# Patient Record
Sex: MALE | Race: WHITE | NOT HISPANIC OR LATINO | Employment: FULL TIME | URBAN - METROPOLITAN AREA
[De-identification: names, ages, dates, MRNs, and addresses within clinical notes are randomized per-mention and may not be internally consistent; named-entity substitution may affect disease eponyms.]

---

## 2024-10-22 ENCOUNTER — HOSPITAL ENCOUNTER (OUTPATIENT)
Dept: OTHER | Facility: HOSPITAL | Age: 59
Discharge: HOME | End: 2024-10-22

## 2024-10-24 ENCOUNTER — OFFICE VISIT (OUTPATIENT)
Dept: FAMILY MEDICINE CLINIC | Facility: CLINIC | Age: 59
End: 2024-10-24
Payer: COMMERCIAL

## 2024-10-24 VITALS
HEIGHT: 72 IN | DIASTOLIC BLOOD PRESSURE: 84 MMHG | TEMPERATURE: 98.3 F | HEART RATE: 89 BPM | SYSTOLIC BLOOD PRESSURE: 126 MMHG | BODY MASS INDEX: 24.92 KG/M2 | OXYGEN SATURATION: 97 % | WEIGHT: 184 LBS

## 2024-10-24 DIAGNOSIS — Z11.59 NEED FOR HEPATITIS C SCREENING TEST: ICD-10-CM

## 2024-10-24 DIAGNOSIS — M51.362 DEGENERATION OF INTERVERTEBRAL DISC OF LUMBAR REGION WITH DISCOGENIC BACK PAIN AND LOWER EXTREMITY PAIN: ICD-10-CM

## 2024-10-24 DIAGNOSIS — M54.16 CHRONIC RADICULAR LUMBAR PAIN: Primary | ICD-10-CM

## 2024-10-24 DIAGNOSIS — Z12.11 SCREEN FOR COLON CANCER: ICD-10-CM

## 2024-10-24 DIAGNOSIS — K57.90 DIVERTICULOSIS: ICD-10-CM

## 2024-10-24 DIAGNOSIS — G89.29 CHRONIC RADICULAR LUMBAR PAIN: Primary | ICD-10-CM

## 2024-10-24 DIAGNOSIS — Z13.220 SCREENING FOR HYPERLIPIDEMIA: ICD-10-CM

## 2024-10-24 DIAGNOSIS — I45.10 RIGHT BUNDLE BRANCH BLOCK: ICD-10-CM

## 2024-10-24 DIAGNOSIS — R94.31 RIGHT AXIS DEVIATION: ICD-10-CM

## 2024-10-24 DIAGNOSIS — Z13.1 SCREENING FOR DIABETES MELLITUS: ICD-10-CM

## 2024-10-24 DIAGNOSIS — Z98.1 S/P ANKLE FUSION: ICD-10-CM

## 2024-10-24 DIAGNOSIS — N40.0 BENIGN PROSTATIC HYPERPLASIA WITHOUT LOWER URINARY TRACT SYMPTOMS: ICD-10-CM

## 2024-10-24 DIAGNOSIS — Z12.5 SCREENING FOR PROSTATE CANCER: ICD-10-CM

## 2024-10-24 PROBLEM — M54.42 CHRONIC LEFT-SIDED LOW BACK PAIN WITH LEFT-SIDED SCIATICA: Status: ACTIVE | Noted: 2024-10-24

## 2024-10-24 LAB
ALBUMIN SERPL-MCNC: 4.5 G/DL (ref 3.5–5.2)
ALBUMIN/GLOB SERPL: 2.1 G/DL
ALP SERPL-CCNC: 54 U/L (ref 39–117)
ALT SERPL W P-5'-P-CCNC: 28 U/L (ref 1–41)
ANION GAP SERPL CALCULATED.3IONS-SCNC: 16 MMOL/L (ref 5–15)
AST SERPL-CCNC: 22 U/L (ref 1–40)
BILIRUB SERPL-MCNC: 0.4 MG/DL (ref 0–1.2)
BUN SERPL-MCNC: 22 MG/DL (ref 8–23)
BUN/CREAT SERPL: 19.8 (ref 7–25)
CALCIUM SPEC-SCNC: 10.1 MG/DL (ref 8.6–10.5)
CHLORIDE SERPL-SCNC: 103 MMOL/L (ref 98–107)
CHOLEST SERPL-MCNC: 230 MG/DL (ref 0–200)
CO2 SERPL-SCNC: 21 MMOL/L (ref 22–29)
CREAT SERPL-MCNC: 1.11 MG/DL (ref 0.76–1.27)
EGFRCR SERPLBLD CKD-EPI 2021: 75.5 ML/MIN/1.73
GLOBULIN UR ELPH-MCNC: 2.1 GM/DL
GLUCOSE SERPL-MCNC: 99 MG/DL (ref 65–99)
HBA1C MFR BLD: 5.6 % (ref 4.8–5.6)
HCV AB SER QL: NORMAL
HDLC SERPL-MCNC: 35 MG/DL (ref 40–60)
LDLC SERPL CALC-MCNC: 165 MG/DL (ref 0–100)
LDLC/HDLC SERPL: 4.65 {RATIO}
POTASSIUM SERPL-SCNC: 4.3 MMOL/L (ref 3.5–5.2)
PROT SERPL-MCNC: 6.6 G/DL (ref 6–8.5)
PSA SERPL-MCNC: 0.72 NG/ML (ref 0–4)
SODIUM SERPL-SCNC: 140 MMOL/L (ref 136–145)
TRIGL SERPL-MCNC: 161 MG/DL (ref 0–150)
VLDLC SERPL-MCNC: 30 MG/DL (ref 5–40)

## 2024-10-24 PROCEDURE — 83036 HEMOGLOBIN GLYCOSYLATED A1C: CPT | Performed by: STUDENT IN AN ORGANIZED HEALTH CARE EDUCATION/TRAINING PROGRAM

## 2024-10-24 PROCEDURE — 86803 HEPATITIS C AB TEST: CPT | Performed by: STUDENT IN AN ORGANIZED HEALTH CARE EDUCATION/TRAINING PROGRAM

## 2024-10-24 PROCEDURE — G0103 PSA SCREENING: HCPCS | Performed by: STUDENT IN AN ORGANIZED HEALTH CARE EDUCATION/TRAINING PROGRAM

## 2024-10-24 PROCEDURE — 80053 COMPREHEN METABOLIC PANEL: CPT | Performed by: STUDENT IN AN ORGANIZED HEALTH CARE EDUCATION/TRAINING PROGRAM

## 2024-10-24 PROCEDURE — 80061 LIPID PANEL: CPT | Performed by: STUDENT IN AN ORGANIZED HEALTH CARE EDUCATION/TRAINING PROGRAM

## 2024-10-24 PROCEDURE — 99204 OFFICE O/P NEW MOD 45 MIN: CPT | Performed by: STUDENT IN AN ORGANIZED HEALTH CARE EDUCATION/TRAINING PROGRAM

## 2024-10-24 RX ORDER — NAPROXEN 500 MG/1
500 TABLET ORAL 2 TIMES DAILY WITH MEALS
Qty: 14 TABLET | Refills: 0 | Status: SHIPPED | OUTPATIENT
Start: 2024-10-24

## 2024-10-24 RX ORDER — CYCLOBENZAPRINE HCL 5 MG
5 TABLET ORAL 3 TIMES DAILY PRN
Qty: 30 TABLET | Refills: 0 | Status: SHIPPED | OUTPATIENT
Start: 2024-10-24

## 2024-10-24 NOTE — PROGRESS NOTES
Chief Complaint  Chief Complaint   Patient presents with    Annual Exam        Subjective       Juvenal Issa presents to [facility]    History of Present Illness  The patient is a 59-year-old male establishing as a new patient.    He has a history of a right ankle fracture, which was surgically repaired. He reports that his foot is off-center and his toes are turning inward. He has been doing his own physical therapy and has regained full range of motion in his ankle, although it remains stiff. He reports no pain in the right ankle but mentions that his toes cramp up if he does not wear shoes.    He reports a discrepancy in his hip alignment, with a difference of 19 mm, and issues with his spine. His chiropractor has recommended an MRI due to fusion of the tailbone and another bone. He experiences pain in his lower back, which he attributes to an incident in [place] where he lifted a suitcase. He also reports sciatic pain, which radiates to his legs and causes twitching. This pain disrupts his sleep and has led to the development of dark circles under his eyes. He has experienced periods of severe pain, particularly over the past weekend, but reports feeling better today. He mentions that his back pain began in 2018 after moving to [place] [employer] and lifting some items. He has been taking Advil for pain relief, sometimes up to five tablets at a time. He reports that his pain level can reach up to 10 on a scale of 1 to 10. He has been able to walk up to two miles after warming up, but reports that his muscles become tight and painful afterward. He reports no incontinence or loss of control of urine or bowel. He also reports no numbness or tingling in the saddle area or groin region.    He reports that his urinary stream has slowed down over the years, but he is able to sleep through the night without needing to urinate. He has regular bowel movements. He has not had blood work done in a  "while and is unsure if it was done at [facility]. He has had a prostate exam in the past. 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 He reports a decrease in  strength. He has not received the COVID-19 vaccine and declines the influenza vaccine. He has not had a colonoscopy or colon cancer screening before.    He had a CT scan of the abdomen when he had a kidney stone. He went to the ER and was given morphine. He took one Percocet at home.    SOCIAL HISTORY  He was in the .    FAMILY HISTORY  He denies any family history of diabetes. His mother is 90 years old and has prediabetes. His father had heart issues.    Past Medical History:    Ankle fracture, right         No Known Allergies       Past Surgical History:   Procedure Laterality Date    ANKLE FUSION Right 2000          Social History     Tobacco Use    Smoking status: Never    Smokeless tobacco: Never   Vaping Use    Vaping status: Never Used   Substance Use Topics    Alcohol use: Not Currently    Drug use: Never         No family history on file.       No current outpatient medications on file prior to visit.     No current facility-administered medications on file prior to visit.           There is no immunization history on file for this patient.          Objective         /84 (BP Location: Left arm, Patient Position: Sitting)   Pulse 89   Temp 98.3 °F (36.8 °C) (Oral)   Ht 182.9 cm (72\")   Wt 83.5 kg (184 lb)   SpO2 97%   BMI 24.95 kg/m²           Physical Exam  Physical Exam  HENT:      Head: Normocephalic and atraumatic.      Nose: Nose normal.      Mouth/Throat:      Mouth: Mucous membranes are moist.   Eyes:      Extraocular Movements: Extraocular movements intact.      Conjunctiva/sclera: Conjunctivae normal.   Cardiovascular:      Rate and Rhythm: Normal rate and regular rhythm.      Heart sounds: No murmur heard.     No friction rub. No gallop. "   Pulmonary:      Effort: No respiratory distress.      Breath sounds: No wheezing, rhonchi or rales.   Abdominal:      General: Abdomen is flat. There is no distension.   Musculoskeletal:         General: No swelling.      Cervical back: Neck supple.      Comments: Right anterior innominate rotation.  Right leg is shorter than left leg on exam.  Tenderness to palpation of the left L5 paravertebral musculature.  Hyper facilitation of the thoracic paravertebral musculature about T10-T7 area.   Skin:     General: Skin is warm and dry.   Neurological:      General: No focal deficit present.      Mental Status: He is alert and oriented to person, place, and time.   Psychiatric:         Mood and Affect: Mood normal.         Behavior: Behavior normal.         Thought Content: Thought content normal.         Judgment: Judgment normal.         Physical Exam        Result Review :    Results  Imaging  Lumbar spine x-ray shows loss of disc height, moderate in intensity.  CT scan from 2017 shows shows disc degeneration at L5-S1 area.    Testing  EKG from 1992 shows a right bundle branch block.  Right axis deviation.             Assessment and Plan     Diagnoses and all orders for this visit:    1. S/P ankle fusion (Primary)    2. Chronic radicular lumbar pain  -     naproxen (Naprosyn) 500 MG tablet; Take 1 tablet by mouth 2 (Two) Times a Day With Meals.  Dispense: 14 tablet; Refill: 0  -     cyclobenzaprine (FLEXERIL) 5 MG tablet; Take 1 tablet by mouth 3 (Three) Times a Day As Needed for Muscle Spasms.  Dispense: 30 tablet; Refill: 0  -     MRI Lumbar Spine Without Contrast; Future    3. Diverticulosis    4. Degeneration of intervertebral disc of lumbar region with discogenic back pain and lower extremity pain    5. Benign prostatic hyperplasia without lower urinary tract symptoms  -     PSA SCREENING    6. Right bundle branch block    7. Right axis deviation    8. Screening for hyperlipidemia  -     Lipid Panel    9.  Screening for diabetes mellitus  -     Hemoglobin A1c  -     Comprehensive Metabolic Panel    10. Screening for prostate cancer  -     PSA SCREENING        Assessment & Plan  1. Radiculopathy.  Symptoms align with radiculopathy, with pain likely originating from the L1, L2, L4, or L5 nerves. The right hip appears to be compensating inadequately, leading to compensatory actions by the spine. A heel lift of approximately 1/4 inch was recommended, with the option to adjust the height based on symptom improvement or worsening. An MRI was ordered to further investigate the issue. Prescriptions for naproxen (twice daily as needed) and a muscle relaxer (up to three times daily as needed) were provided. He was advised to use these medications judiciously and to exercise caution while driving. Engaging in brisk walking, either outdoors or on a treadmill, was encouraged to assess its impact on symptoms. Should symptoms worsen to the point of affecting mobility or sleep, he is to inform the clinic immediately for a re-evaluation. In case of sudden incontinence or new numbness in the groin area, he is to contact the clinic promptly.    2. Diverticulosis.  Diverticulosis is not currently causing any issues. No specific treatment is required at this time.  Seen on CT scan from 2017    3. Slight Prostate Enlargement.  A slight enlargement of the prostate was noted on a previous CT scan, which can be normal. A prostate blood test was ordered to further evaluate the condition.    4. Right Bundle Branch Block.  An EKG showed a right bundle branch block. This will be monitored, and no immediate treatment is required as he is asymptomatic.    6. Health Maintenance.  Baseline blood work was ordered to assess cholesterol, kidney function, liver function, and to screen for diabetes. He declined the COVID-19 booster and influenza vaccine. A tetanus vaccine was offered but declined. Colon cancer screening was discussed but deferred for  now.    Follow-up  Return in about 6 weeks for follow-up.          BMI is within normal parameters. No other follow-up for BMI required.       Follow Up   Return in about 6 weeks (around 12/5/2024) for back pain.    Patient was given instructions and counseling regarding his condition or for health maintenance advice. Please see specific information pulled into the AVS if appropriate.     Juvenal Issa  reports that he has never smoked. He has never used smokeless tobacco.            Patient or patient representative verbalized consent for the use of Ambient Listening during the visit with  Ronal Stinson DO for chart documentation. 10/24/2024  11:22 EDT

## 2024-10-25 DIAGNOSIS — E78.2 MIXED HYPERLIPIDEMIA: Primary | ICD-10-CM

## 2024-10-25 RX ORDER — ROSUVASTATIN CALCIUM 10 MG/1
10 TABLET, COATED ORAL DAILY
Qty: 90 TABLET | Refills: 1 | Status: SHIPPED | OUTPATIENT
Start: 2024-10-25

## 2024-11-14 ENCOUNTER — OFFICE VISIT (OUTPATIENT)
Dept: FAMILY MEDICINE CLINIC | Facility: CLINIC | Age: 59
End: 2024-11-14
Payer: COMMERCIAL

## 2024-11-14 VITALS
HEART RATE: 115 BPM | TEMPERATURE: 98.7 F | BODY MASS INDEX: 24.61 KG/M2 | OXYGEN SATURATION: 96 % | WEIGHT: 181.7 LBS | DIASTOLIC BLOOD PRESSURE: 86 MMHG | HEIGHT: 72 IN | SYSTOLIC BLOOD PRESSURE: 130 MMHG

## 2024-11-14 DIAGNOSIS — M70.62 TROCHANTERIC BURSITIS OF LEFT HIP: ICD-10-CM

## 2024-11-14 DIAGNOSIS — M51.362 DEGENERATION OF INTERVERTEBRAL DISC OF LUMBAR REGION WITH DISCOGENIC BACK PAIN AND LOWER EXTREMITY PAIN: ICD-10-CM

## 2024-11-14 DIAGNOSIS — M50.30 DEGENERATIVE DISC DISEASE, CERVICAL: ICD-10-CM

## 2024-11-14 DIAGNOSIS — G89.29 CHRONIC RADICULAR LUMBAR PAIN: Primary | ICD-10-CM

## 2024-11-14 DIAGNOSIS — M54.16 CHRONIC RADICULAR LUMBAR PAIN: Primary | ICD-10-CM

## 2024-11-14 PROCEDURE — 99214 OFFICE O/P EST MOD 30 MIN: CPT | Performed by: STUDENT IN AN ORGANIZED HEALTH CARE EDUCATION/TRAINING PROGRAM

## 2024-11-14 RX ORDER — DICLOFENAC SODIUM 75 MG/1
75 TABLET, DELAYED RELEASE ORAL 2 TIMES DAILY
Qty: 60 TABLET | Refills: 0 | Status: SHIPPED | OUTPATIENT
Start: 2024-11-14

## 2024-11-14 RX ORDER — PSEUDOEPHED/ACETAMINOPH/DIPHEN 30MG-500MG
TABLET ORAL
COMMUNITY
Start: 2024-10-23

## 2024-11-14 NOTE — PROGRESS NOTES
"Chief Complaint  Follow-up (Medication does not help)    Subjective      Juvenal Issa is a 59 y.o. male who presents to [facility]     History of Present Illness  The patient is a 59-year-old male coming in for a follow-up for left hip pain.    He reports persistent pain in his left hip, as well as left lower leg.  The leg pain is described as a sensation of someone drilling a screw into his leg.  The left hip pain is particularly noticeable when sitting or standing and is located just below the bony part of the hip. He also experiences intermittent pain in the front of his groin. The pain in his hip is more severe than in his knee and is present when sitting, standing, and walking. He rates his current pain level as 7 out of 10.    His leg strength has improved, but he now experiences pain in his left knee, which tends to buckle. He has been taking Aleve, naproxen twice a day, Tylenol Extra Strength, and ibuprofen 500 mg for pain relief. He has been doing stretches, which he believes have helped with his sciatica, but he is concerned about potential damage from the stretches and is considering physical therapy.    He has noticed a disc in his neck that appears to be slipping out and is seeking x-rays from his chiropractor. He has been experiencing low back pain for several years, which he describes as a snapping sensation. He has a leg length discrepancy and has been using a heel lift, but not consistently due to pain. He has been using heat, massage, and ice for his back pain. He has been wearing tennis shoes with an insert for support and using a heating pad at night for relief.       Objective   Vital Signs:   Vitals:    11/14/24 1036   BP: 130/86   BP Location: Left arm   Patient Position: Sitting   Pulse: 115   Temp: 98.7 °F (37.1 °C)   TempSrc: Oral   SpO2: 96%   Weight: 82.4 kg (181 lb 11.2 oz)   Height: 182.9 cm (72\")     Body mass index is 24.64 kg/m².    Wt Readings from " Last 3 Encounters:   11/14/24 82.4 kg (181 lb 11.2 oz)   10/24/24 83.5 kg (184 lb)     BP Readings from Last 3 Encounters:   11/14/24 130/86   10/24/24 126/84       Health Maintenance   Topic Date Due    TDAP/TD VACCINES (1 - Tdap) Never done    ZOSTER VACCINE (1 of 2) Never done    COVID-19 Vaccine (1 - 2024-25 season) Never done    ANNUAL PHYSICAL  Never done    COLORECTAL CANCER SCREENING  11/14/2024 (Originally 1965)    INFLUENZA VACCINE  03/31/2025 (Originally 8/1/2024)    LIPID PANEL  10/24/2025    HEPATITIS C SCREENING  Completed    Pneumococcal Vaccine 0-64  Aged Out       Physical Exam  HENT:      Head: Normocephalic and atraumatic.      Nose: Nose normal.      Mouth/Throat:      Mouth: Mucous membranes are moist.   Eyes:      Extraocular Movements: Extraocular movements intact.      Conjunctiva/sclera: Conjunctivae normal.   Cardiovascular:      Rate and Rhythm: Normal rate and regular rhythm.      Heart sounds: No murmur heard.     No friction rub. No gallop.   Pulmonary:      Effort: No respiratory distress.      Breath sounds: No wheezing, rhonchi or rales.   Abdominal:      General: Abdomen is flat. There is no distension.   Musculoskeletal:         General: No swelling.      Cervical back: Neck supple.      Comments: Pain to palpation of the left trochanteric area   Skin:     General: Skin is warm and dry.   Neurological:      General: No focal deficit present.      Mental Status: He is alert and oriented to person, place, and time.   Psychiatric:         Mood and Affect: Mood normal.         Behavior: Behavior normal.         Thought Content: Thought content normal.         Judgment: Judgment normal.          Physical Exam      Result Review :  The following data was reviewed by: Ronal Stinson DO on 11/14/2024:    No Images in the past 120 days found..     Results  Imaging  Right hip joint space is well preserved with no signs of arthritis. Left hip joint space is also well preserved, but it is  difficult to determine if there is any arthritic disease present. Back x-ray shows a little scoliosis and degenerative disc disease at the lumbosacral joint. Neck x-ray shows some degenerative disc disease at the C6, C7 area.       Procedures          Diagnoses and all orders for this visit:    1. Chronic radicular lumbar pain (Primary)  -     Ambulatory Referral to Physical Therapy for Evaluation & Treatment    2. Degeneration of intervertebral disc of lumbar region with discogenic back pain and lower extremity pain  -     Ambulatory Referral to Physical Therapy for Evaluation & Treatment  -     diclofenac (VOLTAREN) 75 MG EC tablet; Take 1 tablet by mouth 2 (Two) Times a Day.  Dispense: 60 tablet; Refill: 0    3. Trochanteric bursitis of left hip  -     Ambulatory Referral to Physical Therapy for Evaluation & Treatment  -     diclofenac (VOLTAREN) 75 MG EC tablet; Take 1 tablet by mouth 2 (Two) Times a Day.  Dispense: 60 tablet; Refill: 0    4. Degenerative disc disease, cervical  -     Ambulatory Referral to Physical Therapy for Evaluation & Treatment         Assessment & Plan  1. Left hip pain.  The left hip pain is likely due to trochanteric bursitis, exacerbated by compensatory movements for his back and leg length discrepancies. The pain is expected to subside within 4 to 6 weeks with appropriate treatment. A referral for physical therapy has been made to address his back and left hip pain. He has been advised to wear a lift in his shoes to correct the leg length discrepancy. A prescription for diclofenac, to be taken twice daily, has been provided. He has also been advised to continue taking Tylenol Extra Strength 1000 mg three times a day. A Toradol injection was offered for immediate pain relief, but he declined. If there is no significant improvement by December 2024, a hip injection will be considered.    2. Degenerative disc disease.  The patient has degenerative disc disease at the lumbosacral joint  (L5-S1), which is contributing to his lower back pain. He has been advised to start with physical therapy, stretches, ice, and heat treatments. The use of a heel lift in his shoes is recommended to help with leg length discrepancy and alleviate some of the back pain. If there is no significant improvement, further interventions such as injections may be considered.    3. Sciatica.  The patient reports improvement in his sciatica symptoms with stretching exercises. He is advised to continue these exercises and physical therapy to maintain relief. If symptoms worsen, further evaluation and treatment will be considered.    4. Neck pain.  The patient has noted concerns about potential neck issues, x-ray showing potential degenerative disc disease. Although he is not currently experiencing pain, he is advised to strengthen his neck through physical therapy to prevent future issues.    5. Medication management.  He has been advised to switch his pharmacy to [facility] due to issues with [facility]. He has not started his cholesterol medication due to concerns about side effects. He is advised to communicate any pain management needs through the [facility] julio.    Follow-up  Patient is scheduled for a follow-up visit on December 5, 2024.    BMI is within normal parameters. No other follow-up for BMI required.         FOLLOW UP  Return in about 3 months (around 2/14/2025).  Patient was given instructions and counseling regarding his condition or for health maintenance advice. Please see specific information pulled into the AVS if appropriate.     Patient or patient representative verbalized consent for the use of Ambient Listening during the visit with  Ronal Stinson DO for chart documentation. 11/14/2024  12:27 EST    Ronal Stinson DO  11/14/24  12:26 EST    CURRENT & DISCONTINUED MEDICATIONS  Current Outpatient Medications   Medication Instructions    Acetaminophen Extra Strength 500 MG tablet Take 1 tablet by mouth every 4 to 6  hours as needed for pain or fever. Do not exceed 8 tablets in 24 hours unless directed by your doctor.    cyclobenzaprine (FLEXERIL) 5 mg, Oral, 3 Times Daily PRN    diclofenac (VOLTAREN) 75 mg, Oral, 2 Times Daily    rosuvastatin (CRESTOR) 10 mg, Oral, Daily       Medications Discontinued During This Encounter   Medication Reason    naproxen (Naprosyn) 500 MG tablet

## 2024-11-19 ENCOUNTER — HOSPITAL ENCOUNTER (OUTPATIENT)
Dept: MRI IMAGING | Facility: HOSPITAL | Age: 59
Discharge: HOME | End: 2024-11-19
Admitting: STUDENT IN AN ORGANIZED HEALTH CARE EDUCATION/TRAINING PROGRAM
Payer: COMMERCIAL

## 2024-11-19 DIAGNOSIS — G89.29 CHRONIC RADICULAR LUMBAR PAIN: ICD-10-CM

## 2024-11-19 DIAGNOSIS — M54.16 CHRONIC RADICULAR LUMBAR PAIN: ICD-10-CM

## 2024-11-19 PROCEDURE — 72148 MRI LUMBAR SPINE W/O DYE: CPT

## 2024-11-25 ENCOUNTER — OFFICE VISIT (OUTPATIENT)
Dept: FAMILY MEDICINE CLINIC | Facility: CLINIC | Age: 59
End: 2024-11-25
Payer: COMMERCIAL

## 2024-11-25 VITALS
WEIGHT: 181.3 LBS | HEIGHT: 72 IN | SYSTOLIC BLOOD PRESSURE: 110 MMHG | BODY MASS INDEX: 24.56 KG/M2 | HEART RATE: 121 BPM | OXYGEN SATURATION: 97 % | DIASTOLIC BLOOD PRESSURE: 80 MMHG | TEMPERATURE: 97.9 F

## 2024-11-25 DIAGNOSIS — M51.362 DEGENERATION OF INTERVERTEBRAL DISC OF LUMBAR REGION WITH DISCOGENIC BACK PAIN AND LOWER EXTREMITY PAIN: ICD-10-CM

## 2024-11-25 DIAGNOSIS — G89.29 CHRONIC RADICULAR LUMBAR PAIN: Primary | ICD-10-CM

## 2024-11-25 DIAGNOSIS — M50.30 DEGENERATIVE DISC DISEASE, CERVICAL: ICD-10-CM

## 2024-11-25 DIAGNOSIS — M54.16 CHRONIC RADICULAR LUMBAR PAIN: Primary | ICD-10-CM

## 2024-11-25 PROCEDURE — 99214 OFFICE O/P EST MOD 30 MIN: CPT | Performed by: STUDENT IN AN ORGANIZED HEALTH CARE EDUCATION/TRAINING PROGRAM

## 2024-11-25 RX ORDER — TRAMADOL HYDROCHLORIDE 50 MG/1
50 TABLET ORAL EVERY 8 HOURS PRN
Qty: 30 TABLET | Refills: 0 | Status: SHIPPED | OUTPATIENT
Start: 2024-11-25

## 2024-11-25 NOTE — PROGRESS NOTES
"Chief Complaint  Follow-up (MRI results)    Subjective      Juvenal Issa is a 59 y.o. male who presents to [facility]     History of Present Illness  The patient is a 59-year-old male who presents for evaluation of back pain and MRI results.    He reports experiencing various types of pain, including neck stiffness that previously limited his mobility. He does not have any current neck pain or radiating pain down his arms. He has been diagnosed with degenerative disc disease. His pain is managed with Aleve and ibuprofen, but these do not completely alleviate his discomfort. Diclofenac has been effective in reducing his pain although he still feels severe pain and is requesting something stronger. He has used Flexeril on a few occasions when his pain was severe, but it left him feeling groggy. He is seeking stronger pain relief as his current condition prevents him from working all day.    He uses crutches occasionally and has ordered a cane for additional support. He experiences constant numbness in his left leg and has had episodes of knee buckling and weakness in his left leg. He does not have any bowel or bladder incontinence.  The weakness in his left leg is transient and is not consistent.  He denies any red flag symptoms such as urinary bladder or bowel incontinence or saddle anesthesia.    His appetite has decreased, leading to weight loss. He expresses a desire to walk but fears the pain it may cause.       Objective   Vital Signs:   Vitals:    11/25/24 0725   BP: 110/80   BP Location: Left arm   Patient Position: Sitting   Pulse: (!) 121   Temp: 97.9 °F (36.6 °C)   TempSrc: Oral   SpO2: 97%   Weight: 82.2 kg (181 lb 4.8 oz)   Height: 182.9 cm (72\")     Body mass index is 24.59 kg/m².    Wt Readings from Last 3 Encounters:   11/25/24 82.2 kg (181 lb 4.8 oz)   11/14/24 82.4 kg (181 lb 11.2 oz)   10/24/24 83.5 kg (184 lb)     BP Readings from Last 3 Encounters:   11/25/24 " 110/80   11/14/24 130/86   10/24/24 126/84       Health Maintenance   Topic Date Due    COLORECTAL CANCER SCREENING  Never done    TDAP/TD VACCINES (1 - Tdap) Never done    ZOSTER VACCINE (1 of 2) Never done    COVID-19 Vaccine (1 - 2024-25 season) Never done    ANNUAL PHYSICAL  Never done    INFLUENZA VACCINE  03/31/2025 (Originally 8/1/2024)    LIPID PANEL  10/24/2025    HEPATITIS C SCREENING  Completed    Pneumococcal Vaccine 0-64  Aged Out       Physical Exam  HENT:      Head: Normocephalic and atraumatic.      Nose: Nose normal.      Mouth/Throat:      Mouth: Mucous membranes are moist.   Eyes:      Extraocular Movements: Extraocular movements intact.      Conjunctiva/sclera: Conjunctivae normal.   Pulmonary:      Effort: No respiratory distress.   Abdominal:      General: Abdomen is flat. There is no distension.   Musculoskeletal:         General: No swelling.      Cervical back: Neck supple.      Comments: Standing and sitting at different intervals during discussing MRI results.   Skin:     General: Skin is warm and dry.   Neurological:      General: No focal deficit present.      Mental Status: He is alert and oriented to person, place, and time.   Psychiatric:         Mood and Affect: Mood normal.         Behavior: Behavior normal.         Thought Content: Thought content normal.         Judgment: Judgment normal.          Physical Exam      Result Review :  The following data was reviewed by: Ronal Stinson DO on 11/25/2024:    MRI Lumbar Spine Without Contrast    Result Date: 11/22/2024  1.Lumbar spondylosis with 4 mm retrolisthesis at L5-S1. 2.At L3-L4, there is a left paramedian disc extrusion which extends caudally from the disc space by 15 mm and measures 7 mm AP. This effaces the left lateral recess and encroaches upon the traversing left L4 nerve root. This is superimposed upon a broad-based disc bulge with left greater than right facet arthropathy. There is moderate left and mild right neural  foraminal narrowing at this level. 3.At L1-L2, a central disc extrusion extends of both cephalad and caudally from the disc space measuring approximately 18 mm CC and 3 to 4 mm AP. There is effacement of the anterior thecal sac without significant spinal canal or neural foraminal stenosis  at this level. 4.Additional multilevel neural foraminal narrowing. 5.Please see above for additional details. Electronically Signed: Camron Cai MD  11/22/2024 9:09 AM EST  Workstation ID: [id number]       Results  Imaging  MRI of the back shows no significant central canal narrowing, but there is foraminal narrowing at L5-S1. There is also impingement on the left side at L3-L4.       Procedures          Diagnoses and all orders for this visit:    1. Chronic radicular lumbar pain (Primary)  -     traMADol (ULTRAM) 50 MG tablet; Take 1 tablet by mouth Every 8 (Eight) Hours As Needed for Severe Pain.  Dispense: 30 tablet; Refill: 0  -     Ambulatory Referral to Neurosurgery    2. Degeneration of intervertebral disc of lumbar region with discogenic back pain and lower extremity pain  -     traMADol (ULTRAM) 50 MG tablet; Take 1 tablet by mouth Every 8 (Eight) Hours As Needed for Severe Pain.  Dispense: 30 tablet; Refill: 0  -     Ambulatory Referral to Neurosurgery    3. Degenerative disc disease, cervical  -     MRI Cervical Spine Without Contrast; Future  -     traMADol (ULTRAM) 50 MG tablet; Take 1 tablet by mouth Every 8 (Eight) Hours As Needed for Severe Pain.  Dispense: 30 tablet; Refill: 0  -     Ambulatory Referral to Neurosurgery         Assessment & Plan  1. Back pain.  MRI results show no significant central canal narrowing but moderate to severe stenosis at L5-S1 as well as impingement of L4 nerve root which I believe is the cause to most of his symptoms. The pain is not fully managed with Aleve, diclofenac, or Flexeril. Tramadol 50 mg, to be taken up to three times daily, was prescribed for severe pain. The potential  side effects, including drowsiness, were discussed. An MRI of the neck was ordered to address past stiffness and ensure proper care during physical therapy. A referral to Dr. Sofia, a neurosurgeon, was made for further evaluation per patient request. Physical therapy has already been arranged and I discussed with patient that neurosurgery would like to see physical therapy completed before any surgical interventions to be performed.  Patient verbalized understanding.    2. Degenerative disc disease.  The MRI revealed degenerative disc disease. The patient experiences constant numbness in the legs and occasional knee buckling. He was advised to continue using diclofenac and Flexeril as needed. The use of a knee brace was discussed to help with knee stability. The patient was also advised to use heat or ice for pain management.      BMI is within normal parameters. No other follow-up for BMI required.         FOLLOW UP  Return in about 4 weeks (around 12/23/2024) for Back pain.  Patient was given instructions and counseling regarding his condition or for health maintenance advice. Please see specific information pulled into the AVS if appropriate.     Patient or patient representative verbalized consent for the use of Ambient Listening during the visit with  Ronal Stinson DO for chart documentation. 11/25/2024  07:53 EST    Ronal Stinson DO  11/25/24  07:53 EST    CURRENT & DISCONTINUED MEDICATIONS  Current Outpatient Medications   Medication Instructions    Acetaminophen Extra Strength 500 MG tablet Take 1 tablet by mouth every 4 to 6 hours as needed for pain or fever. Do not exceed 8 tablets in 24 hours unless directed by your doctor.    cyclobenzaprine (FLEXERIL) 5 mg, Oral, 3 Times Daily PRN    diclofenac (VOLTAREN) 75 mg, Oral, 2 Times Daily    rosuvastatin (CRESTOR) 10 mg, Oral, Daily    traMADol (ULTRAM) 50 mg, Oral, Every 8 Hours PRN       There are no discontinued medications.

## 2024-12-04 DIAGNOSIS — M70.62 TROCHANTERIC BURSITIS OF LEFT HIP: ICD-10-CM

## 2024-12-04 DIAGNOSIS — M51.362 DEGENERATION OF INTERVERTEBRAL DISC OF LUMBAR REGION WITH DISCOGENIC BACK PAIN AND LOWER EXTREMITY PAIN: ICD-10-CM

## 2024-12-04 RX ORDER — DICLOFENAC SODIUM 75 MG/1
75 TABLET, DELAYED RELEASE ORAL 2 TIMES DAILY
Qty: 60 TABLET | Refills: 0 | Status: SHIPPED | OUTPATIENT
Start: 2024-12-04

## 2024-12-16 ENCOUNTER — OFFICE VISIT (OUTPATIENT)
Dept: FAMILY MEDICINE CLINIC | Facility: CLINIC | Age: 59
End: 2024-12-16
Payer: COMMERCIAL

## 2024-12-16 VITALS
OXYGEN SATURATION: 97 % | HEIGHT: 72 IN | WEIGHT: 182.1 LBS | HEART RATE: 91 BPM | DIASTOLIC BLOOD PRESSURE: 86 MMHG | BODY MASS INDEX: 24.66 KG/M2 | TEMPERATURE: 98 F | SYSTOLIC BLOOD PRESSURE: 130 MMHG

## 2024-12-16 DIAGNOSIS — M50.30 DEGENERATIVE DISC DISEASE, CERVICAL: ICD-10-CM

## 2024-12-16 DIAGNOSIS — M51.26 HERNIATED INTERVERTEBRAL DISC OF LUMBAR SPINE: Primary | ICD-10-CM

## 2024-12-16 DIAGNOSIS — G89.29 CHRONIC RADICULAR LUMBAR PAIN: ICD-10-CM

## 2024-12-16 DIAGNOSIS — M54.16 CHRONIC RADICULAR LUMBAR PAIN: ICD-10-CM

## 2024-12-16 DIAGNOSIS — M70.62 TROCHANTERIC BURSITIS OF LEFT HIP: ICD-10-CM

## 2024-12-16 DIAGNOSIS — M21.611 BUNION OF GREAT TOE OF RIGHT FOOT: ICD-10-CM

## 2024-12-16 PROBLEM — M51.362 DEGENERATION OF INTERVERTEBRAL DISC OF LUMBAR REGION WITH DISCOGENIC BACK PAIN AND LOWER EXTREMITY PAIN: Status: RESOLVED | Noted: 2024-10-24 | Resolved: 2024-12-16

## 2024-12-16 PROCEDURE — 99214 OFFICE O/P EST MOD 30 MIN: CPT | Performed by: STUDENT IN AN ORGANIZED HEALTH CARE EDUCATION/TRAINING PROGRAM

## 2024-12-16 RX ORDER — DICLOFENAC SODIUM 75 MG/1
75 TABLET, DELAYED RELEASE ORAL 2 TIMES DAILY
Qty: 60 TABLET | Refills: 0 | Status: SHIPPED | OUTPATIENT
Start: 2024-12-16

## 2024-12-16 NOTE — PROGRESS NOTES
"Chief Complaint  Follow-up    Subjective      Juvenal Issa is a 59 y.o. male who presents to [facility]     History of Present Illness  The patient is a 59-year-old male presenting for back pain, neck pain, and right foot bunion.    He reports an improvement in his condition, as evidenced by his ability to walk 2 miles yesterday. He has been managing his pain without the use of tramadol for the past week. He has been utilizing crutches and a cane for mobility but notes an improvement in leg strength, particularly when ascending stairs. He experienced knee hyperflexion during his walk yesterday, which he did not experience today. He has been incorporating rest periods into his workday, lying down for 2 to 3 days after half-day work shifts. He has been performing exercises at home and has an upcoming appointment with physical therapist and neurosurgeon at the end of this month. He reports no pain upon waking this morning, which is a new development. He does not require any refills of his pain medications at this time. He has been using tramadol sparingly, taking only half or a third of a tablet as needed for sleep. He took diclofenac yesterday due to a minor flare-up of bursitis following his walk.    He also reports a worsening bunion on his right foot, which he first noticed in 2012.  He reports instability in his foot, particularly when running. He has had all metal hardware removed from his ankle after the fracture years ago.    MEDICATIONS  Current: Tramadol, diclofenac.       Objective   Vital Signs:   Vitals:    12/16/24 0716   BP: 130/86   BP Location: Left arm   Patient Position: Sitting   Pulse: 91   Temp: 98 °F (36.7 °C)   TempSrc: Oral   SpO2: 97%   Weight: 82.6 kg (182 lb 1.6 oz)   Height: 182.9 cm (72\")     Body mass index is 24.7 kg/m².    Wt Readings from Last 3 Encounters:   12/16/24 82.6 kg (182 lb 1.6 oz)   11/25/24 82.2 kg (181 lb 4.8 oz)   11/14/24 82.4 kg (181 " lb 11.2 oz)     BP Readings from Last 3 Encounters:   12/16/24 130/86   11/25/24 110/80   11/14/24 130/86       Health Maintenance   Topic Date Due    COLORECTAL CANCER SCREENING  Never done    TDAP/TD VACCINES (1 - Tdap) Never done    ZOSTER VACCINE (1 of 2) Never done    COVID-19 Vaccine (1 - 2024-25 season) Never done    ANNUAL PHYSICAL  Never done    INFLUENZA VACCINE  03/31/2025 (Originally 7/1/2024)    LIPID PANEL  10/24/2025    HEPATITIS C SCREENING  Completed    Pneumococcal Vaccine 0-64  Aged Out       Physical Exam  HENT:      Head: Normocephalic and atraumatic.      Nose: Nose normal.      Mouth/Throat:      Mouth: Mucous membranes are moist.   Eyes:      Extraocular Movements: Extraocular movements intact.      Conjunctiva/sclera: Conjunctivae normal.   Cardiovascular:      Rate and Rhythm: Normal rate and regular rhythm.      Heart sounds: No murmur heard.     No friction rub. No gallop.   Pulmonary:      Effort: No respiratory distress.      Breath sounds: No wheezing, rhonchi or rales.   Abdominal:      General: Abdomen is flat. There is no distension.   Musculoskeletal:      Cervical back: Neck supple.      Comments: Bunion right great toe   Skin:     General: Skin is warm and dry.   Neurological:      General: No focal deficit present.      Mental Status: He is alert and oriented to person, place, and time.   Psychiatric:         Mood and Affect: Mood normal.         Behavior: Behavior normal.         Thought Content: Thought content normal.         Judgment: Judgment normal.          Physical Exam      Result Review :  The following data was reviewed by: Ronal Stinson DO on 12/16/2024:    MRI Lumbar Spine Without Contrast    Result Date: 11/22/2024  1.Lumbar spondylosis with 4 mm retrolisthesis at L5-S1. 2.At L3-L4, there is a left paramedian disc extrusion which extends caudally from the disc space by 15 mm and measures 7 mm AP. This effaces the left lateral recess and encroaches upon the  traversing left L4 nerve root. This is superimposed upon a broad-based disc bulge with left greater than right facet arthropathy. There is moderate left and mild right neural foraminal narrowing at this level. 3.At L1-L2, a central disc extrusion extends of both cephalad and caudally from the disc space measuring approximately 18 mm CC and 3 to 4 mm AP. There is effacement of the anterior thecal sac without significant spinal canal or neural foraminal stenosis  at this level. 4.Additional multilevel neural foraminal narrowing. 5.Please see above for additional details. Electronically Signed: Camron Cai MD  11/22/2024 9:09 AM EST  Workstation ID: [id number]       Results         Procedures          Diagnoses and all orders for this visit:    1. Herniated intervertebral disc of lumbar spine (Primary)  -     diclofenac (VOLTAREN) 75 MG EC tablet; Take 1 tablet by mouth 2 (Two) Times a Day.  Dispense: 60 tablet; Refill: 0    2. Chronic radicular lumbar pain    3. Degenerative disc disease, cervical    4. Trochanteric bursitis of left hip  -     diclofenac (VOLTAREN) 75 MG EC tablet; Take 1 tablet by mouth 2 (Two) Times a Day.  Dispense: 60 tablet; Refill: 0    5. Bunion of great toe of right foot  -     Ambulatory Referral to Podiatry         Assessment & Plan  1.    His condition is showing significant improvement. He has not taken tramadol for the past week and has been managing his pain with diclofenac as needed. A prescription for Voltaren (diclofenac) to be taken twice daily as needed has been refilled. He is advised to continue with physical therapy and follow up with the spinal specialist as scheduled.    2.  Degenerative disc disease, cervical.  Patient is scheduled for MRI on 12/23.  No current pain noted today.  We will continue to monitor.  Follow-up with neurosurgeon.    3. Right foot bunion.  He has a bunion on his right foot that has been worsening and causing instability. A referral to a foot  specialist has been made to discuss potential surgical options for the bunion.    Follow-up  The patient will follow up in 3 months.    BMI is within normal parameters. No other follow-up for BMI required.         FOLLOW UP  Return in about 3 months (around 3/16/2025) for back pain, right bunion.  Patient was given instructions and counseling regarding his condition or for health maintenance advice. Please see specific information pulled into the AVS if appropriate.     Patient or patient representative verbalized consent for the use of Ambient Listening during the visit with  Ronal Stinson DO for chart documentation. 12/16/2024  07:36 EST    Ronal Stinson DO  12/16/24  07:39 EST    CURRENT & DISCONTINUED MEDICATIONS  Current Outpatient Medications   Medication Instructions    Acetaminophen Extra Strength 500 MG tablet Take 1 tablet by mouth every 4 to 6 hours as needed for pain or fever. Do not exceed 8 tablets in 24 hours unless directed by your doctor.    cyclobenzaprine (FLEXERIL) 5 mg, Oral, 3 Times Daily PRN    diclofenac (VOLTAREN) 75 mg, Oral, 2 Times Daily    rosuvastatin (CRESTOR) 10 mg, Oral, Daily    traMADol (ULTRAM) 50 mg, Oral, Every 8 Hours PRN       Medications Discontinued During This Encounter   Medication Reason    diclofenac (VOLTAREN) 75 MG EC tablet Reorder

## 2024-12-23 ENCOUNTER — HOSPITAL ENCOUNTER (OUTPATIENT)
Dept: MRI IMAGING | Facility: HOSPITAL | Age: 59
Discharge: HOME | End: 2024-12-23
Admitting: STUDENT IN AN ORGANIZED HEALTH CARE EDUCATION/TRAINING PROGRAM
Payer: COMMERCIAL

## 2024-12-23 DIAGNOSIS — M50.30 DEGENERATIVE DISC DISEASE, CERVICAL: ICD-10-CM

## 2024-12-23 PROCEDURE — 72141 MRI NECK SPINE W/O DYE: CPT

## 2024-12-30 DIAGNOSIS — G89.29 CHRONIC RADICULAR LUMBAR PAIN: ICD-10-CM

## 2024-12-30 DIAGNOSIS — M50.30 DEGENERATIVE DISC DISEASE, CERVICAL: ICD-10-CM

## 2024-12-30 DIAGNOSIS — M51.362 DEGENERATION OF INTERVERTEBRAL DISC OF LUMBAR REGION WITH DISCOGENIC BACK PAIN AND LOWER EXTREMITY PAIN: ICD-10-CM

## 2024-12-30 DIAGNOSIS — M54.16 CHRONIC RADICULAR LUMBAR PAIN: ICD-10-CM

## 2024-12-31 ENCOUNTER — OFFICE VISIT (OUTPATIENT)
Dept: NEUROSURGERY | Facility: CLINIC | Age: 59
End: 2024-12-31
Payer: COMMERCIAL

## 2024-12-31 VITALS
SYSTOLIC BLOOD PRESSURE: 120 MMHG | WEIGHT: 184.7 LBS | HEIGHT: 72 IN | BODY MASS INDEX: 25.02 KG/M2 | HEART RATE: 130 BPM | DIASTOLIC BLOOD PRESSURE: 88 MMHG

## 2024-12-31 DIAGNOSIS — M43.17 SPONDYLOLISTHESIS AT L5-S1 LEVEL: ICD-10-CM

## 2024-12-31 DIAGNOSIS — M48.061 FORAMINAL STENOSIS OF LUMBAR REGION: ICD-10-CM

## 2024-12-31 DIAGNOSIS — M54.2 CERVICALGIA: ICD-10-CM

## 2024-12-31 DIAGNOSIS — M48.02 FORAMINAL STENOSIS OF CERVICAL REGION: ICD-10-CM

## 2024-12-31 DIAGNOSIS — M51.26 HERNIATED NUCLEUS PULPOSUS, L3-4 LEFT: Primary | ICD-10-CM

## 2024-12-31 DIAGNOSIS — M47.812 FACET ARTHRITIS OF CERVICAL REGION: ICD-10-CM

## 2024-12-31 DIAGNOSIS — M50.30 DDD (DEGENERATIVE DISC DISEASE), CERVICAL: ICD-10-CM

## 2024-12-31 RX ORDER — TRAMADOL HYDROCHLORIDE 50 MG/1
50 TABLET ORAL EVERY 8 HOURS PRN
Qty: 30 TABLET | Refills: 0 | Status: SHIPPED | OUTPATIENT
Start: 2024-12-31

## 2024-12-31 NOTE — PROGRESS NOTES
"Chief Complaint  Leg Pain and Disk Problem- Cervical     Subjective          Juvenal Issa who is a 59 y.o. year old male who presents to [facility] for Evaluation of the Spine.     The patient complains of pain located in the Cervical and Lumbar Spine. The lower back is his main concern today. Patients states the pain has been present for 3 months.  The pain came on gradually.  The pain scaled level is 8.  The pain does radiate. Dermatomes are located on left Lumbar at: to the thigh, then to the anterior shin.  The pain is constant and waxing/waning and described as  \"drilling\", \"knife pain, stabbing in the thigh\" .  The pain is worse at no particular time of day. Patient states Pain Medication and walking, heating pad makes the pain better.  Patient states  moving around, prolonged sitting or standing makes the pain worse.    Associated Symptoms Include: Numbness in the left leg with sitting, intermittent weakness in the left leg. Denies loss of bowel or bladder control.  Conservative Interventions Include: Pain Medications that were somewhat effective., NSAIDs that were somewhat effective., and Muscle Relaxants that were somewhat effective.    Was this the result of an injury or accident?: Yes, spiral fracture in the right leg.    History of Previous Spinal Surgery?: No     reports that he has never smoked. He has never used smokeless tobacco.    Review of Systems   Musculoskeletal:  Positive for back pain (with left leg pain).   Neurological:  Positive for weakness and numbness.        Objective   Vital Signs:   /88   Pulse (!) 130   Ht 182.9 cm (72.01\")   Wt 83.8 kg (184 lb 11.2 oz)   BMI 25.04 kg/m²       Physical Exam  Constitutional:       Appearance: Normal appearance.   Pulmonary:      Effort: Pulmonary effort is normal.   Musculoskeletal:         General: No tenderness.      Comments: SLR on the left worsens thigh pain   Neurological:      General: No " focal deficit present.      Mental Status: He is alert and oriented to person, place, and time.      Sensory: No sensory deficit.      Motor: No weakness.      Deep Tendon Reflexes: Reflexes normal.   Psychiatric:         Mood and Affect: Mood normal.         Behavior: Behavior normal.        Neurological Exam  Mental Status  Alert. Oriented to person, place, and time.      Result Review     I have personally interpreted the MRI of cervical spine without contrast from 12/23/2024 which shows multilevel degenerative disc disease and facet arthritis with severe bilateral foraminal narrowing at C5-C6.  There is moderate left foraminal narrowing at C6-C7.  Moderate right foraminal narrowing at C4-C5.  Moderate right foraminal narrowing at C3-C4.    I have personally interpreted the MRI of the lumbar spine without contrast from 11/19/2024 which shows retrolisthesis of L5 on S1.  There is moderate to severe bilateral foraminal stenosis at this level.  There is a left disc extrusion at L3-L4 which may affect the left traversing L4 nerve root.  Otherwise there is no significant stenosis.     Assessment and Plan    Diagnoses and all orders for this visit:    1. Herniated nucleus pulposus, L3-4 left (Primary)  -     Ambulatory Referral to Occupational Therapy for Evaluation & Treatment  -     Ambulatory Referral to Physical Therapy for Evaluation & Treatment    2. Spondylolisthesis at L5-S1 level  -     Ambulatory Referral to Occupational Therapy for Evaluation & Treatment  -     Ambulatory Referral to Physical Therapy for Evaluation & Treatment    3. Foraminal stenosis of lumbar region  -     Ambulatory Referral to Occupational Therapy for Evaluation & Treatment  -     Ambulatory Referral to Physical Therapy for Evaluation & Treatment    4. DDD (degenerative disc disease), cervical    5. Facet arthritis of cervical region    6. Foraminal stenosis of cervical region    7. Cervicalgia    He has pain in the left leg passing the  knee.    He does have a disc extrusion at L3-L4 on the left which may affect the L4 nerve root. He may consider for the left leg pain. Surgery typically will not help back pain.    He may consider PT conservatively. He may also benefit from OT with his history of radial fracture in the right leg which altered his gait.    He may benefit from pain management for spinal injections.    The patient was counseled on basic recommendations for the reduction and prevention of back, neck, or spine pain in association with spinal disorders, including: cessation/avoidance of nicotine use, maintenance of a healthy BMI and weight, focusing on building/maintaining core strength through core exercise, and avoidance of activities which worsen the pain. The patient will monitor for changes in symptoms and notify our clinic of these changes as needed.    Follow Up   Return if symptoms worsen or fail to improve.  Patient was given instructions and counseling regarding his condition or for health maintenance advice. Please see specific information pulled into the AVS if appropriate.

## 2025-01-03 ENCOUNTER — PATIENT ROUNDING (BHMG ONLY) (OUTPATIENT)
Dept: NEUROSURGERY | Facility: CLINIC | Age: 60
End: 2025-01-03
Payer: COMMERCIAL

## 2025-01-07 ENCOUNTER — TREATMENT (OUTPATIENT)
Dept: PHYSICAL THERAPY | Facility: CLINIC | Age: 60
End: 2025-01-07
Payer: COMMERCIAL

## 2025-01-07 DIAGNOSIS — M43.10 RETROLISTHESIS OF VERTEBRAE: ICD-10-CM

## 2025-01-07 DIAGNOSIS — G89.29 CHRONIC LEFT-SIDED LOW BACK PAIN WITH LEFT-SIDED SCIATICA: Primary | ICD-10-CM

## 2025-01-07 DIAGNOSIS — M47.26 OSTEOARTHRITIS OF SPINE WITH RADICULOPATHY, LUMBAR REGION: ICD-10-CM

## 2025-01-07 DIAGNOSIS — M54.42 CHRONIC LEFT-SIDED LOW BACK PAIN WITH LEFT-SIDED SCIATICA: Primary | ICD-10-CM

## 2025-01-07 PROCEDURE — 97140 MANUAL THERAPY 1/> REGIONS: CPT | Performed by: PHYSICAL THERAPIST

## 2025-01-07 PROCEDURE — 97162 PT EVAL MOD COMPLEX 30 MIN: CPT | Performed by: PHYSICAL THERAPIST

## 2025-01-07 PROCEDURE — 97535 SELF CARE MNGMENT TRAINING: CPT | Performed by: PHYSICAL THERAPIST

## 2025-01-07 PROCEDURE — 97110 THERAPEUTIC EXERCISES: CPT | Performed by: PHYSICAL THERAPIST

## 2025-01-07 NOTE — PROGRESS NOTES
Physical Therapy Initial Evaluation and Plan of Care     [address] [place] [address]    Patient: Juvenal Issa   : 1965  Diagnosis/ICD-10 Code:  Chronic left-sided low back pain with left-sided sciatica [M54.42, G89.29]  Referring practitioner: Ronal Stinson DO  Date of Initial Visit: 2025  Today's Date: 2025  Patient seen for 1 sessions           Subjective Questionnaire: Oswestry: 25      Subjective  : Pt presents to physical therapy for low back pain with referral into the left lower extremity. This worsened in September, had great difficulty moving or doing anything. Reports symptoms go into the left lower leg at times along with cramping in his thigh and hip. Better with walking, standing. Worse with bending, rolling over. In , suffered a R ankle fracture that's made him walk differently for years. See MRI results below:    MRI 24  IMPRESSION:  1.Lumbar spondylosis with 4 mm retrolisthesis at L5-S1.   2.At L3-L4, there is a left paramedian disc extrusion which extends caudally from the disc space by 15 mm and measures 7 mm AP. This effaces the left lateral recess and encroaches upon the traversing left L4 nerve root. This is superimposed upon a   broad-based disc bulge with left greater than right facet arthropathy. There is moderate left and mild right neural foraminal narrowing at this level.  3.At L1-L2, a central disc extrusion extends of both cephalad and caudally from the disc space measuring approximately 18 mm CC and 3 to 4 mm AP. There is effacement of the anterior thecal sac without significant spinal canal or neural foraminal stenosis   at this level.  4.Additional multilevel neural foraminal narrowing.      Pt occupation/Living environment: lives alone in two story home, works for [employer] mainly sitting    Patient Goals: Reduce back and leg pain, return to more active lifestyle    Current Pain 2/10  Best Pain: 2/10  Worst Pain: 7/10  Quality of pain:  aching, radiating, cramping        Past Medical Hx:    Past Medical History:   Diagnosis Date    Ankle fracture, right       Past Surgical History:   Procedure Laterality Date    ANKLE FUSION Right 2000             Objective          Postural Observations    Additional Postural Observation Details  Decreased lumbar lordosis    Tenderness     Left Hip   Tenderness in the PSIS.     Right Hip   Tenderness in the PSIS.     Additional Tenderness Details  Lumbar spine hypomobile, mild pain    Neurological Testing     Sensation     Lumbar   Left   Intact: light touch    Right   Intact: light touch    Reflexes   Left   Patellar (L4): trace (1+)  Achilles (S1): trace (1+)    Right   Patellar (L4): normal (2+)  Achilles (S1): normal (2+)    Active Range of Motion     Lumbar   Flexion: Active lumbar flexion: to mid shin, L hamstring tightness.   Extension: Active lumbar extension: limited 25%   Left lateral flexion: Active left lumbar lateral flexion: limited 50%   Right lateral flexion: Active right lumbar lateral flexion: limited 25%   Left rotation: WFL  Right rotation: WFL    Strength/Myotome Testing     Left Hip   Planes of Motion   Flexion: 4-  Extension: 4  Abduction: 4  Adduction: 4    Right Hip   Planes of Motion   Flexion: 4+  Extension: 4+  Abduction: 4+  Adduction: 4+    Tests       Thoracic   Positive slump.     Lumbar   Positive repeated extension.     Left   Negative crossed SLR.     Right   Negative crossed SLR.     Ambulation     Comments   Right ankle eversion, supination, increased valgus curvature of R tibia      See Exercise, Manual, and Modality Logs for complete treatment.     Assessment & Plan       Assessment  Impairments: abnormal muscle firing, abnormal or restricted ROM, activity intolerance, impaired physical strength and pain with function   Functional limitations: carrying objects, lifting, walking, uncomfortable because of pain, sitting and standing   Assessment details: The patient presents to  physical therapy with complaints of low back pain with radiation into the left lower extremity with weakness and functional gait changes. Seemed to respond well with extension direction and core stability today. The patient presents with associated lower extremity weakness, lumbar stiffness, and functional deficits (OSWESTRY). The patient would benefit from skilled PT intervention to address the above mentioned functional limitations.     Prognosis: good    Goals  Plan Goals: LOW BACK PROBLEMS:    1. The patient complains of low back pain.  LTG 1: 12 weeks:  The patient will report a pain rating of 3/10 or better at worst in order to improve  tolerance to activities of daily living and improve sleep quality.   STATUS:  New  STG 1a: 6 weeks:  The patient will report a pain rating of 5/10 or better at its worst.   STATUS:  New        2. The patient demonstrates weakness of the left hip.  LTG 2: 12 weeks:  The patient will demonstrate 5 /5 strength for L hip flexion, abduction,  and extension in order to improve hip stability.   STATUS:  New  STG 2a: 6 weeks:  The patient will demonstrate 4+ /5 strength for L hip flexion, abduction,  and extension.   STATUS:  New        3. Mobility: Walking/Moving Around Functional Limitation    LTG 3: 12 weeks:  The patient will demonstrate 1-19 % limitation by achieving a score of 1-9 on the SUZANNE.   STATUS:  New  STG 3a: The patient will be independent with HEP.     STATUS:  New        4. The patient has limited lumbar AROM  LTG 4: 12 weeks:  The patient will demonstrate lumbar AROM as follows: fingertips to distal shin for flexion and no limitation for extension.   STATUS:  New        5. The patient reports radicular symptoms in the left lower extremity.  LTG 5: 12 weeks:  The patient will report a decrease in radicular symptoms in the L lower extremity by 50%.   STATUS:  New  STG 5a: 6 weeks:  The patient will report a decrease in radicular symptoms in the L lower extremity by  25%.   STATUS:  New                   Plan  Therapy options: will be seen for skilled therapy services  Planned modality interventions: TENS, cryotherapy, thermotherapy (hydrocollator packs), traction and dry needling  Other planned modality interventions: aquatic therapy  Planned therapy interventions: manual therapy, stretching, strengthening, therapeutic activities, neuromuscular re-education, home exercise program, joint mobilization, functional ROM exercises, soft tissue mobilization, spinal/joint mobilization, flexibility and gait training  Other planned therapy interventions: aquatic therapy  Frequency: 3x week  Duration in weeks: 12  Treatment plan discussed with: patient        Visit Diagnoses:    ICD-10-CM ICD-9-CM   1. Chronic left-sided low back pain with left-sided sciatica  M54.42 724.2    G89.29 724.3     338.29   2. Osteoarthritis of spine with radiculopathy, lumbar region  M47.26 721.3   3. Retrolisthesis of vertebrae  M43.10 738.4       History # of Personal Factors and/or Comorbidities: MODERATE (1-2)  Examination of Body System(s): # of elements: MODERATE (3)  Clinical Presentation: EVOLVING  Clinical Decision Making: MODERATE      Timed:         Manual Therapy:    8     mins  21860;     Therapeutic Exercise:    10     mins  75029;     Neuromuscular Ben:    0    mins  55165;    Therapeutic Activity:     0     mins  41549;     Gait Trainin     mins  72611;     Ultrasound:     0     mins  82325;    Ionto                               0    mins   87063  Self Care                       10     mins   09922        Un-Timed:  Electrical Stimulation:    0     mins  51204 ( );  Dry Needling     0     mins self-pay  Canalith Repos    0     mins 18084  Traction     0     mins 10970      Timed Treatment:   28   mins   Total Treatment:     65   mins    PT SIGNATURE: Mio Whaley PT     Electronically signed 2025    [place] License: PT - [id number]     Initial Certification  Certification  Period: 1/7/2025 thru 4/6/2025  I certify that the therapy services are furnished while this patient is under my care.  The services outlined above are required by this patient, and will be reviewed every 90 days.     PHYSICIAN: Ronal Stinson DO   NPI: [id number]                                        DATE:     Please sign and return via fax to [phone]. Thank you, [facility].

## 2025-01-10 ENCOUNTER — TREATMENT (OUTPATIENT)
Dept: PHYSICAL THERAPY | Facility: CLINIC | Age: 60
End: 2025-01-10
Payer: COMMERCIAL

## 2025-01-10 DIAGNOSIS — G89.29 CHRONIC LEFT-SIDED LOW BACK PAIN WITH LEFT-SIDED SCIATICA: Primary | ICD-10-CM

## 2025-01-10 DIAGNOSIS — M43.10 RETROLISTHESIS OF VERTEBRAE: ICD-10-CM

## 2025-01-10 DIAGNOSIS — M47.26 OSTEOARTHRITIS OF SPINE WITH RADICULOPATHY, LUMBAR REGION: ICD-10-CM

## 2025-01-10 DIAGNOSIS — M54.42 CHRONIC LEFT-SIDED LOW BACK PAIN WITH LEFT-SIDED SCIATICA: Primary | ICD-10-CM

## 2025-01-10 NOTE — PROGRESS NOTES
Physical Therapy Daily Treatment Note  [address] [place] [address]    Patient: Juvenal Issa   : 1965  Diagnosis/ICD-10 Code:  Chronic left-sided low back pain with left-sided sciatica [M54.42, G89.29]  Referring practitioner: Harvey Bennett PA-C  Date of Initial Visit: Type: THERAPY  Noted: 2025  Today's Date: 1/10/2025  Patient seen for 2 sessions           Subjective : Patient reports he is doing good overall, still sore in his left quad.     Objective   See Exercise, Manual, and Modality Logs for complete treatment.       Assessment/Plan : Pt tolerated today's session well. Progressed with manual therapy targeting the pelvis, improved symmetry using MET techniques. Used exercises to improve stability.  Pt would benefit from continued skilled therapy to address deficits. Progress per plan of care.             Timed:  Manual Therapy:    15     mins  62890;  Therapeutic Exercise:    10     mins  08674;     Neuromuscular Ben:    8    mins  18261;    Therapeutic Activity:     10     mins  17482;     Gait Trainin     mins  19414;     Ultrasound:     0     mins  69914;    Aquatic Therapy    0     mins  05596;    Untimed:  Electrical Stimulation:    0     mins  88268 ( );  Dry Needling     0     mins self-pay;  Mechanical Traction    0     mins  00083  Moist Heat     0     mins  No charge  Canalith Repos              0     mins 60886    Timed Treatment:   43   mins   Total Treatment:     43   mins    Mio Whaley PT  Physical Therapist    Electronically signed 1/10/2025    [place] License: PT - [id number]

## 2025-01-13 ENCOUNTER — TREATMENT (OUTPATIENT)
Dept: PHYSICAL THERAPY | Facility: CLINIC | Age: 60
End: 2025-01-13
Payer: COMMERCIAL

## 2025-01-13 DIAGNOSIS — G89.29 CHRONIC LEFT-SIDED LOW BACK PAIN WITH LEFT-SIDED SCIATICA: Primary | ICD-10-CM

## 2025-01-13 DIAGNOSIS — M54.42 CHRONIC LEFT-SIDED LOW BACK PAIN WITH LEFT-SIDED SCIATICA: Primary | ICD-10-CM

## 2025-01-13 DIAGNOSIS — M47.26 OSTEOARTHRITIS OF SPINE WITH RADICULOPATHY, LUMBAR REGION: ICD-10-CM

## 2025-01-13 DIAGNOSIS — M43.10 RETROLISTHESIS OF VERTEBRAE: ICD-10-CM

## 2025-01-13 PROCEDURE — 97140 MANUAL THERAPY 1/> REGIONS: CPT | Performed by: PHYSICAL THERAPIST

## 2025-01-13 PROCEDURE — 97530 THERAPEUTIC ACTIVITIES: CPT | Performed by: PHYSICAL THERAPIST

## 2025-01-13 PROCEDURE — 97110 THERAPEUTIC EXERCISES: CPT | Performed by: PHYSICAL THERAPIST

## 2025-01-13 NOTE — PROGRESS NOTES
Physical Therapy Daily Treatment Note  [address] [place] [address]    Patient: Juvenal Issa   : 1965  Diagnosis/ICD-10 Code:  Chronic left-sided low back pain with left-sided sciatica [M54.42, G89.29]  Referring practitioner: Harvey Bennett PA-C  Date of Initial Visit: Type: THERAPY  Noted: 2025  Today's Date: 2025  Patient seen for 3 sessions           Subjective : Patient reports he felt better following the last session. He can tell with walking and driving that his hips feel more even.     Objective   See Exercise, Manual, and Modality Logs for complete treatment.       Assessment/Plan : Pt tolerated today's session well. Pt demonstrated improved symmetry of leg length, however, still off today which corrected with manual therapy. Pt would benefit from continued skilled therapy to address deficits. Progress per plan of care.             Timed:  Manual Therapy:    15     mins  62772;  Therapeutic Exercise:    10     mins  18641;     Neuromuscular Ben:    0    mins  46856;    Therapeutic Activity:     8     mins  67212;     Gait Trainin     mins  69254;     Ultrasound:     0     mins  40710;    Aquatic Therapy    0     mins  34044;    Untimed:  Electrical Stimulation:    0     mins  71961 ( );  Dry Needling     0     mins self-pay;  Mechanical Traction    0     mins  91216  Moist Heat     0     mins  No charge  Canalith Repos              0     mins 56483    Timed Treatment:   33   mins   Total Treatment:     33   mins    Mio Whaley PT  Physical Therapist    Electronically signed 2025    [place] License: PT - [id number]

## 2025-01-17 ENCOUNTER — TREATMENT (OUTPATIENT)
Dept: PHYSICAL THERAPY | Facility: CLINIC | Age: 60
End: 2025-01-17
Payer: COMMERCIAL

## 2025-01-17 DIAGNOSIS — G89.29 CHRONIC LEFT-SIDED LOW BACK PAIN WITH LEFT-SIDED SCIATICA: Primary | ICD-10-CM

## 2025-01-17 DIAGNOSIS — M43.10 RETROLISTHESIS OF VERTEBRAE: ICD-10-CM

## 2025-01-17 DIAGNOSIS — M47.26 OSTEOARTHRITIS OF SPINE WITH RADICULOPATHY, LUMBAR REGION: ICD-10-CM

## 2025-01-17 DIAGNOSIS — M54.42 CHRONIC LEFT-SIDED LOW BACK PAIN WITH LEFT-SIDED SCIATICA: Primary | ICD-10-CM

## 2025-01-17 NOTE — PROGRESS NOTES
Physical Therapy Daily Treatment Note  [address] [place] [address]    Patient: Juvenal Issa   : 1965  Diagnosis/ICD-10 Code:  Chronic left-sided low back pain with left-sided sciatica [M54.42, G89.29]  Referring practitioner: Harvey Bennett PA-C  Date of Initial Visit: Type: THERAPY  Noted: 2025  Today's Date: 2025  Patient seen for 4 sessions           Subjective : Patient reports he has been doing better overall, reports going to the gym and performing the exercises in addition to his other activities.     Objective   See Exercise, Manual, and Modality Logs for complete treatment.       Assessment/Plan : Pt tolerated today's session well. Leg length noted to be nearly symmetrical today. Progressed with interventions targeting the left posterior thigh for hamstring and possible sciatic nerve involvement. Pt would benefit from continued skilled therapy to address deficits. Progress per plan of care.             Timed:  Manual Therapy:    24     mins  94831;  Therapeutic Exercise:    10     mins  55228;     Neuromuscular Ben:    0    mins  55437;    Therapeutic Activity:     0     mins  69496;     Gait Trainin     mins  55053;     Ultrasound:     0     mins  48080;    Aquatic Therapy    0     mins  45273;    Untimed:  Electrical Stimulation:    0     mins  56448 ( );  Dry Needling     0     mins self-pay;  Mechanical Traction    0     mins  38071  Moist Heat     0     mins  No charge  Canalith Repos              0     mins 43456    Timed Treatment:   34   mins   Total Treatment:     34   mins    Mio Whaley PT  Physical Therapist    Electronically signed 2025    [place] License: PT - [id number]

## 2025-01-24 ENCOUNTER — TREATMENT (OUTPATIENT)
Dept: PHYSICAL THERAPY | Facility: CLINIC | Age: 60
End: 2025-01-24
Payer: COMMERCIAL

## 2025-01-24 DIAGNOSIS — M43.10 RETROLISTHESIS OF VERTEBRAE: ICD-10-CM

## 2025-01-24 DIAGNOSIS — M47.26 OSTEOARTHRITIS OF SPINE WITH RADICULOPATHY, LUMBAR REGION: ICD-10-CM

## 2025-01-24 DIAGNOSIS — M54.42 CHRONIC LEFT-SIDED LOW BACK PAIN WITH LEFT-SIDED SCIATICA: Primary | ICD-10-CM

## 2025-01-24 DIAGNOSIS — G89.29 CHRONIC LEFT-SIDED LOW BACK PAIN WITH LEFT-SIDED SCIATICA: Primary | ICD-10-CM

## 2025-01-24 NOTE — PROGRESS NOTES
Physical Therapy Daily Treatment Note  [address] [place] [address]    Patient: Juvenal Issa   : 1965  Diagnosis/ICD-10 Code:  Chronic left-sided low back pain with left-sided sciatica [M54.42, G89.29]  Referring practitioner: Harvey Bennett PA-C  Date of Initial Visit: Type: THERAPY  Noted: 2025  Today's Date: 2025  Patient seen for 5 sessions           Subjective : Patient reports he noticed having pain shooting down his leg when he was lifting bags from [employer]. He has noticed his left leg feeling weaker at times, but doing better today.    Objective   See Exercise, Manual, and Modality Logs for complete treatment.       Assessment/Plan : Pt tolerated today's session well. Progressed with stretches targeting his QL and hip. Max education performed in regards to his imaging studies and discussed ongoing HEP. Pt would benefit from continued skilled therapy to address deficits. Progress per plan of care.             Timed:  Manual Therapy:    0     mins  29046;  Therapeutic Exercise:    25     mins  49583;     Neuromuscular Ben:    0    mins  23674;    Therapeutic Activity:     10     mins  34291;     Gait Trainin     mins  43402;     Ultrasound:     0     mins  52203;    Aquatic Therapy    0     mins  30892;        Timed Treatment:   35   mins   Total Treatment:     35   mins    Mio Whaley PT  Physical Therapist    Electronically signed 2025    [place] License: PT - [id number]

## 2025-01-27 ENCOUNTER — TREATMENT (OUTPATIENT)
Dept: PHYSICAL THERAPY | Facility: CLINIC | Age: 60
End: 2025-01-27
Payer: COMMERCIAL

## 2025-01-27 DIAGNOSIS — M54.42 CHRONIC LEFT-SIDED LOW BACK PAIN WITH LEFT-SIDED SCIATICA: Primary | ICD-10-CM

## 2025-01-27 DIAGNOSIS — M43.10 RETROLISTHESIS OF VERTEBRAE: ICD-10-CM

## 2025-01-27 DIAGNOSIS — G89.29 CHRONIC LEFT-SIDED LOW BACK PAIN WITH LEFT-SIDED SCIATICA: Primary | ICD-10-CM

## 2025-01-27 DIAGNOSIS — M47.26 OSTEOARTHRITIS OF SPINE WITH RADICULOPATHY, LUMBAR REGION: ICD-10-CM

## 2025-01-27 PROCEDURE — 97140 MANUAL THERAPY 1/> REGIONS: CPT

## 2025-01-27 PROCEDURE — 97530 THERAPEUTIC ACTIVITIES: CPT

## 2025-01-27 PROCEDURE — 97112 NEUROMUSCULAR REEDUCATION: CPT

## 2025-01-27 NOTE — PROGRESS NOTES
Physical Therapy Daily Treatment Note                          Patient: Juvenal Issa   : 1965  Diagnosis/ICD-10 Code:  Chronic left-sided low back pain with left-sided sciatica [M54.42, G89.29]  Referring practitioner: Harvey Bennett PA-C  Date of Initial Visit: No linked episodes  Today's Date: 2025  Patient seen for Visit count could not be calculated. Make sure you are using a visit which is associated with an episode. sessions           Subjective   The patient reported    Objective   See Exercise, Manual, and Modality Logs for complete treatment.     Assessment/Plan     Patient tolerated today's treatment without complaints of pain. Improvements noted in back symptoms following MET. Worked on introducing anti rotation strengthening as well as hip stabilizer/NM control. Strength, ROM, and increased pain with activity deficits still limits patient's ability to perform ADLs. Further skilled care indicated at this time.       Timed:  Manual Therapy:    8     mins  40652;  Therapeutic Exercise:    0     mins  07894;     Neuromuscular Ben:   20    mins  44724;    Therapeutic Activity:     10     mins  81191;     Gait Trainin     mins  93111;     Aquatics                         0      mins  09900    Un-timed:  Mechanical Traction      0     mins  65983  Dry Needling     0     mins self-pay  Electrical Stimulation:    0     mins  88911 ( );      Timed Treatment:   38   mins   Total Treatment:     38   mins    Jaime Alfaro PT  Physical Therapist    Electronically signed 2025    [place] License: PT - [id number]

## 2025-01-31 ENCOUNTER — TREATMENT (OUTPATIENT)
Dept: PHYSICAL THERAPY | Facility: CLINIC | Age: 60
End: 2025-01-31
Payer: COMMERCIAL

## 2025-01-31 DIAGNOSIS — M47.26 OSTEOARTHRITIS OF SPINE WITH RADICULOPATHY, LUMBAR REGION: ICD-10-CM

## 2025-01-31 DIAGNOSIS — G89.29 CHRONIC LEFT-SIDED LOW BACK PAIN WITH LEFT-SIDED SCIATICA: Primary | ICD-10-CM

## 2025-01-31 DIAGNOSIS — M54.42 CHRONIC LEFT-SIDED LOW BACK PAIN WITH LEFT-SIDED SCIATICA: Primary | ICD-10-CM

## 2025-01-31 DIAGNOSIS — M43.10 RETROLISTHESIS OF VERTEBRAE: ICD-10-CM

## 2025-01-31 PROCEDURE — 97164 PT RE-EVAL EST PLAN CARE: CPT | Performed by: PHYSICAL THERAPIST

## 2025-01-31 PROCEDURE — 97530 THERAPEUTIC ACTIVITIES: CPT | Performed by: PHYSICAL THERAPIST

## 2025-01-31 PROCEDURE — 97110 THERAPEUTIC EXERCISES: CPT | Performed by: PHYSICAL THERAPIST

## 2025-01-31 NOTE — PROGRESS NOTES
Progress Assessment  [address] [place] [address]        Patient: Juvenal Issa   : 1965  Diagnosis/ICD-10 Code:  Chronic left-sided low back pain with left-sided sciatica [M54.42, G89.29]  Referring practitioner: Harvey Bennett PA-C  Date of Initial Visit: Type: THERAPY  Noted: 2025  Today's Date: 2025  Patient seen for 7 sessions      Subjective:     Subjective Questionnaire: Oswestry: 6%  Clinical Progress: improved  Home Program Compliance: Yes  Treatment has included: therapeutic exercise, neuromuscular re-education, manual therapy, and therapeutic activity    Subjective : Juvenal Issa reports: he is feeling better overall. Having less lower back pain, less pain into his left leg. He can tell he is still weaker on that side. But he has been going to the gym, he does have some pain and soreness with exercises. He has had a good few days in a row. He can tell a difference with his walking, feels more symmetrical.    Current Pain -2/10      Objective    Postural Observations     Additional Postural Observation Details  Decreased lumbar lordosis     Tenderness      Left Hip   Tenderness in the PSIS.      Right Hip   Tenderness in the PSIS.      Additional Tenderness Details  Lumbar spine hypomobile, mild pain     Neurological Testing      Sensation      Lumbar   Left   Intact: light touch     Right   Intact: light touch     Reflexes   Left   Patellar (L4): trace (1+)  Achilles (S1): trace (1+)     Right   Patellar (L4): normal (2+)  Achilles (S1): normal (2+)     Active Range of Motion      Lumbar   Flexion: Active lumbar flexion: to mid shin, L hamstring tightness.   Extension: Active lumbar extension: limited 25%   Left lateral flexion: Active left lumbar lateral flexion: limited 50%   Right lateral flexion: Active right lumbar lateral flexion: limited 25%   Left rotation: WFL  Right rotation: WFL     Strength/Myotome Testing      Left Hip   Planes of Motion   Flexion:  4-  Extension: 4  Abduction: 4  Adduction: 4     Right Hip   Planes of Motion   Flexion: 4+  Extension: 4+  Abduction: 4+  Adduction: 4+     Tests         Thoracic   Positive slump.      Lumbar   Positive repeated extension.      Left   Negative crossed SLR.      Right   Negative crossed SLR.      Ambulation      Comments   Right ankle eversion, supination, increased valgus curvature of R tibia       Assessment/Plan    Assessment details: The patient presents to physical therapy with complaints of low back pain with radiation into the left lower extremity with weakness and functional gait changes. Pt has responded well to manual therapy interventions for posture correction and this has helped correlate with improved function and less pain. He has been able to progress from basic HEP exercises to now returning to the gym. Updated goals for progression back to his prior status as well as targeting long term improvement. The patient presents with associated lower extremity weakness, lumbar stiffness, and functional deficits (OSWESTRY). The patient would benefit from skilled PT intervention to address the above mentioned functional limitations.      Prognosis: good     Goals  Plan Goals: LOW BACK PROBLEMS:     1. The patient complains of low back pain.  LTG 1: 12 weeks:  The patient will report a pain rating of 3/10 or better at worst in order to improve tolerance to activities of daily living and improve sleep quality.              STATUS:  MET currently  STG 1a: 6 weeks:  The patient will report a pain rating of 5/10 or better at its worst.              STATUS:  MET currently    LTG 1b: 12 weeks:  The patient will report a pain rating no greater than 2/10 or better at worst for THREE WEEKS in order to improve tolerance to activities of daily living and improve sleep quality.              STATUS:  NEW           2. The patient demonstrates weakness of the left hip.  LTG 2: 12 weeks:  The patient will demonstrate 5 /5  strength for L hip flexion, abduction,  and extension in order to improve hip stability.              STATUS:  Not met  STG 2a: 6 weeks:  The patient will demonstrate 4+ /5 strength for L hip flexion, abduction,  and extension.              STATUS:  Not met    LTG 2b: 12 weeks:  The patient will demonstrate full gym routine, pain free for THREE WEEKS in order to demonstrate fully established and progressed HEP for the long term and return to his prior status.              STATUS:  NEW        3. Mobility: Walking/Moving Around Functional Limitation                   LTG 3: 12 weeks:  The patient will demonstrate 1-19 % limitation by achieving a score of 1-9 on the SUZANNE.              STATUS:  MET currently  STG 3a: The patient will be independent with HEP.                STATUS: MET           4. The patient has limited lumbar AROM  LTG 4: 12 weeks:  The patient will demonstrate lumbar AROM as follows: fingertips to distal shin for flexion and no limitation for extension.              STATUS: MET currently           5. The patient reports radicular symptoms in the left lower extremity.  LTG 5: 12 weeks:  The patient will report a decrease in radicular symptoms in the L lower extremity by 50%.              STATUS:  Not met  STG 5a: 6 weeks:  The patient will report a decrease in radicular symptoms in the L lower extremity by 25%.              STATUS:  MET            Progress toward previous goals: Partially Met    See Exercise, Manual, and Modality Logs for complete treatment.         Recommendations: Continue with recommendations continue to assess response to exercises with progression of resistance training.  Timeframe: 2 months  Prognosis to achieve goals: good    PT Signature: Mio Whaley PT    Electronically signed 1/31/2025    [place] License: PT - [id number]     Based upon review of the patient's progress and continued therapy plan, it is my medical opinion that Juvenal Issa should continue physical therapy  treatment at [facility] PH[facility]  [address] [place] [address] [phone].      Timed:         Manual Therapy:    0     mins  92980;     Therapeutic Exercise:    10     mins  74803;     Neuromuscular Ben:    0    mins  86596;    Therapeutic Activity:     8     mins  96701;     Gait Trainin     mins  66160;     Ultrasound:     0     mins  75884;    Self Care                       0     mins   91175  Aquatic                          0     mins 00396        Timed Treatment:   18   mins   Total Treatment:     30   mins      I certify that the therapy services are furnished while this patient is under my care.  The services outlined above are required by this patient, and will be reviewed every 90 days.

## 2025-02-02 DIAGNOSIS — M51.26 HERNIATED INTERVERTEBRAL DISC OF LUMBAR SPINE: ICD-10-CM

## 2025-02-02 DIAGNOSIS — M70.62 TROCHANTERIC BURSITIS OF LEFT HIP: ICD-10-CM

## 2025-02-03 ENCOUNTER — TREATMENT (OUTPATIENT)
Dept: PHYSICAL THERAPY | Facility: CLINIC | Age: 60
End: 2025-02-03
Payer: COMMERCIAL

## 2025-02-03 DIAGNOSIS — G89.29 CHRONIC LEFT-SIDED LOW BACK PAIN WITH LEFT-SIDED SCIATICA: Primary | ICD-10-CM

## 2025-02-03 DIAGNOSIS — M54.42 CHRONIC LEFT-SIDED LOW BACK PAIN WITH LEFT-SIDED SCIATICA: Primary | ICD-10-CM

## 2025-02-03 DIAGNOSIS — M43.10 RETROLISTHESIS OF VERTEBRAE: ICD-10-CM

## 2025-02-03 DIAGNOSIS — M47.26 OSTEOARTHRITIS OF SPINE WITH RADICULOPATHY, LUMBAR REGION: ICD-10-CM

## 2025-02-03 PROCEDURE — 97110 THERAPEUTIC EXERCISES: CPT | Performed by: PHYSICAL THERAPIST

## 2025-02-03 PROCEDURE — 97530 THERAPEUTIC ACTIVITIES: CPT | Performed by: PHYSICAL THERAPIST

## 2025-02-03 PROCEDURE — 97140 MANUAL THERAPY 1/> REGIONS: CPT | Performed by: PHYSICAL THERAPIST

## 2025-02-03 RX ORDER — DICLOFENAC SODIUM 75 MG/1
75 TABLET, DELAYED RELEASE ORAL 2 TIMES DAILY
Qty: 60 TABLET | Refills: 0 | Status: SHIPPED | OUTPATIENT
Start: 2025-02-03

## 2025-02-03 NOTE — PROGRESS NOTES
Physical Therapy Daily Treatment Note  [address] [place] [id number]    Patient: Juvenal Issa   : 1965  Diagnosis/ICD-10 Code:  Chronic left-sided low back pain with left-sided sciatica [M54.42, G89.29]  Referring practitioner: Harvey Bennett PA-C  Date of Initial Visit: Type: THERAPY  Noted: 2025  Today's Date: 2/3/2025  Patient seen for 8 sessions           Subjective : Patient reports he is doing well, relatively pain free. Mild pain in the L3 region. The left hamstring is  to touch. He is inquiring about other interventions at the gym for hamstring stretching.    Objective   See Exercise, Manual, and Modality Logs for complete treatment.       Assessment/Plan : Pt tolerated today's session well. Progressed with manual therapy targeting the hamstring and reviewed/discussed ongoing HEP. Pt would benefit from continued skilled therapy to address deficits. Progress per plan of care.             Timed:  Manual Therapy:    10     mins  77383;  Therapeutic Exercise:    8     mins  14987;     Neuromuscular Ben:    0    mins  28706;    Therapeutic Activity:     10     mins  81229;     Gait Trainin     mins  76879;     Ultrasound:     0     mins  54933;    Aquatic Therapy    0     mins  45553;    Untimed:  Electrical Stimulation:    0     mins  99044 ( );  Dry Needling     0     mins self-pay;  Mechanical Traction    0     mins  33242  Moist Heat     0     mins  No charge  Canalith Repos              0     mins 32320    Timed Treatment:   28   mins   Total Treatment:     28   mins    Mio Whaley PT  Physical Therapist    Electronically signed 2/3/2025    [place] License: PT - [id number]

## 2025-02-07 ENCOUNTER — TREATMENT (OUTPATIENT)
Dept: PHYSICAL THERAPY | Facility: CLINIC | Age: 60
End: 2025-02-07
Payer: COMMERCIAL

## 2025-02-07 ENCOUNTER — OFFICE VISIT (OUTPATIENT)
Dept: PODIATRY | Facility: CLINIC | Age: 60
End: 2025-02-07
Payer: COMMERCIAL

## 2025-02-07 VITALS
WEIGHT: 182 LBS | DIASTOLIC BLOOD PRESSURE: 89 MMHG | OXYGEN SATURATION: 95 % | BODY MASS INDEX: 24.65 KG/M2 | HEART RATE: 109 BPM | SYSTOLIC BLOOD PRESSURE: 136 MMHG | TEMPERATURE: 98.2 F | HEIGHT: 72 IN

## 2025-02-07 DIAGNOSIS — M54.42 CHRONIC LEFT-SIDED LOW BACK PAIN WITH LEFT-SIDED SCIATICA: Primary | ICD-10-CM

## 2025-02-07 DIAGNOSIS — M43.10 RETROLISTHESIS OF VERTEBRAE: ICD-10-CM

## 2025-02-07 DIAGNOSIS — G89.29 CHRONIC LEFT-SIDED LOW BACK PAIN WITH LEFT-SIDED SCIATICA: Primary | ICD-10-CM

## 2025-02-07 DIAGNOSIS — M19.071 ARTHRITIS OF RIGHT FOOT: ICD-10-CM

## 2025-02-07 DIAGNOSIS — M47.26 OSTEOARTHRITIS OF SPINE WITH RADICULOPATHY, LUMBAR REGION: ICD-10-CM

## 2025-02-07 DIAGNOSIS — M20.11 HALLUX VALGUS OF RIGHT FOOT: Primary | ICD-10-CM

## 2025-02-07 PROCEDURE — DRYNDLTRIAL DRY NEEDLING TRIAL: Performed by: PHYSICAL THERAPIST

## 2025-02-07 PROCEDURE — 99203 OFFICE O/P NEW LOW 30 MIN: CPT | Performed by: PODIATRIST

## 2025-02-07 PROCEDURE — 97140 MANUAL THERAPY 1/> REGIONS: CPT | Performed by: PHYSICAL THERAPIST

## 2025-02-07 PROCEDURE — 97110 THERAPEUTIC EXERCISES: CPT | Performed by: PHYSICAL THERAPIST

## 2025-02-07 NOTE — PROGRESS NOTES
Physical Therapy Daily Treatment Note  [address] [place] [address]    Patient: Juvenal Issa   : 1965  Diagnosis/ICD-10 Code:  Chronic left-sided low back pain with left-sided sciatica [M54.42, G89.29]  Referring practitioner: Harvey Bennett PA-C  Date of Initial Visit: Type: THERAPY  Noted: 2025  Today's Date: 2025  Patient seen for 9 sessions           Subjective : Patient reports he is doing better, though still having tightness and cramping in his left thigh. He is willing to try dry needling today.    Objective   See Exercise, Manual, and Modality Logs for complete treatment.       Assessment/Plan : Pt tolerated today's session well. Pt gives written and verbal consent with description of procedure and review of risks, benefits, precautions, contraindications, and potential side effects. Education provided for post needling soreness and expectations. All dry needling performed to appropriate depth with bony backdrop, or threading to avoid any anatomic structures that are contraindicated. No adverse events noted. Pt would benefit from continued skilled therapy to address deficits. Progress per plan of care.             Timed:  Manual Therapy:    8     mins  89545;  Therapeutic Exercise:    10     mins  30504;     Neuromuscular Ben:    0    mins  19559;    Therapeutic Activity:     0     mins  99103;     Gait Trainin     mins  85298;     Ultrasound:     0     mins  65448;    Aquatic Therapy    0     mins  43368;    Untimed:  Electrical Stimulation:    0     mins  35232 ( );  Dry Needling     12     mins First treatment trial  Mechanical Traction    0     mins  46062  Moist Heat     0     mins  No charge  Canalith Repos              0     mins 10800    Timed Treatment:   18   mins   Total Treatment:     30   mins    Mio Whaley PT  Physical Therapist    Electronically signed 2025    [place] License: PT - [id number]

## 2025-02-10 ENCOUNTER — TREATMENT (OUTPATIENT)
Dept: PHYSICAL THERAPY | Facility: CLINIC | Age: 60
End: 2025-02-10
Payer: COMMERCIAL

## 2025-02-10 DIAGNOSIS — M43.10 RETROLISTHESIS OF VERTEBRAE: ICD-10-CM

## 2025-02-10 DIAGNOSIS — G89.29 CHRONIC LEFT-SIDED LOW BACK PAIN WITH LEFT-SIDED SCIATICA: Primary | ICD-10-CM

## 2025-02-10 DIAGNOSIS — M54.42 CHRONIC LEFT-SIDED LOW BACK PAIN WITH LEFT-SIDED SCIATICA: Primary | ICD-10-CM

## 2025-02-10 DIAGNOSIS — M47.26 OSTEOARTHRITIS OF SPINE WITH RADICULOPATHY, LUMBAR REGION: ICD-10-CM

## 2025-02-10 PROCEDURE — 20561 NDL INSJ W/O NJX 3+ MUSC: CPT | Performed by: PHYSICAL THERAPIST

## 2025-02-10 PROCEDURE — 97140 MANUAL THERAPY 1/> REGIONS: CPT | Performed by: PHYSICAL THERAPIST

## 2025-02-10 NOTE — PROGRESS NOTES
[facility] - PODIATRY    Today's Date: 02/10/25    Patient Name: Juvenal Issa  MRN: [id number]  CSN: [id number]  PCP: Ronal Stinson DO,   Referring Provider: Ronal Stinson DO    SUBJECTIVE     Chief Complaint   Patient presents with    Right Foot - Establish Care, Pain, Bunions     Tib fib fx 2000 with hardware removal 2002  states since that time foot rolls in and now has bunion      HPI: Juvneal Issa, a 59 y.o.male, presents to clinic.    History of Present Illness  The patient is a 59-year-old male who presents for evaluation of a bunion.    He had a tib-fib fracture back in 2000 and subsequently underwent hardware removal in 2002. He reports that the fracture healed uneventfully. Since the surgery, he has noticed a slight inward rotation of his foot and the development of a bunion in the area. Although the bunion is not currently causing him any discomfort, he has observed a progressive worsening of the condition. He is here to have it evaluated and to discuss potential treatment options.         Past Medical History:   Diagnosis Date    Ankle fracture, right     Difficulty walking 2000    Spiral Fracture - foot is tilted, big toe turning inwards     Past Surgical History:   Procedure Laterality Date    ANKLE FUSION Right 2000     History reviewed. No pertinent family history.  Social History     Socioeconomic History    Marital status: Single   Tobacco Use    Smoking status: Never    Smokeless tobacco: Never   Vaping Use    Vaping status: Never Used   Substance and Sexual Activity    Alcohol use: Not Currently    Drug use: Never    Sexual activity: Never     No Known Allergies  Current Outpatient Medications   Medication Sig Dispense Refill    Acetaminophen Extra Strength 500 MG tablet Take 1 tablet by mouth every 4 to 6 hours as needed for pain or fever. Do not exceed 8 tablets in 24 hours unless directed by your doctor.      cyclobenzaprine (FLEXERIL) 5 MG tablet Take 1 tablet by mouth  "3 (Three) Times a Day As Needed for Muscle Spasms. 30 tablet 0    diclofenac (VOLTAREN) 75 MG EC tablet TAKE 1 TABLET BY MOUTH TWICE A DAY 60 tablet 0    rosuvastatin (Crestor) 10 MG tablet Take 1 tablet by mouth Daily. 90 tablet 1    traMADol (ULTRAM) 50 MG tablet Take 1 tablet by mouth Every 8 (Eight) Hours As Needed for Severe Pain. 30 tablet 0     No current facility-administered medications for this visit.     Review of Systems   All other systems reviewed and are negative.      OBJECTIVE     Vitals:    02/07/25 0815   BP: 136/89   Pulse: 109   Temp: 98.2 °F (36.8 °C)   SpO2: 95%       No results found for: \"WBC\", \"RBC\", \"HGB\", \"HCT\", \"MCV\", \"MCH\", \"MCHC\", \"RDW\", \"RDWSD\", \"MPV\", \"PLT\", \"NEUTRORELPCT\", \"LYMPHORELPCT\", \"MONORELPCT\", \"EOSRELPCT\", \"BASORELPCT\", \"AUTOIGPER\", \"NEUTROABS\", \"LYMPHSABS\", \"MONOSABS\", \"EOSABS\", \"BASOSABS\", \"AUTOIGNUM\", \"NRBC\"      Lab Results   Component Value Date    GLUCOSE 99 10/24/2024    BUN 22 10/24/2024    CREATININE 1.11 10/24/2024    BCR 19.8 10/24/2024    K 4.3 10/24/2024    CO2 21.0 (L) 10/24/2024    CALCIUM 10.1 10/24/2024    ALBUMIN 4.5 10/24/2024    AST 22 10/24/2024    ALT 28 10/24/2024       Patient seen in no apparent distress.      PHYSICAL EXAM:     Foot/Ankle Exam    GENERAL  Appearance:  appears stated age  Orientation:  AAOx3  Affect:  appropriate  Gait:  unimpaired  Assistance:  independent  Right shoe gear: casual shoe  Left shoe gear: casual shoe    VASCULAR     Right Foot Vascularity   Normal vascular exam    Dorsalis pedis:  2+  Posterior tibial:  2+  Skin temperature:  warm  Edema grading:  None  CFT:  < 3 seconds  Pedal hair growth:  Present  Varicosities:  none     Left Foot Vascularity   Normal vascular exam    Dorsalis pedis:  2+  Posterior tibial:  2+  Skin temperature:  warm  Edema grading:  None  CFT:  < 3 seconds  Pedal hair growth:  Present  Varicosities:  none     NEUROLOGIC     Right Foot Neurologic   Normal sensation    Light touch sensation: " normal  Vibratory sensation: normal  Hot/Cold sensation: normal  Protective Sensation using Brandywine-Chencho Monofilament:   Sites intact: 10  Sites tested: 10     Left Foot Neurologic   Normal sensation    Light touch sensation: normal  Vibratory sensation: normal  Hot/Cold sensation:  normal  Protective Sensation using Brandywine-Chencho Monofilament:   Sites intact: 10  Sites tested: 10    MUSCULOSKELETAL     Right Foot Musculoskeletal   Hallux valgus: Yes      MUSCLE STRENGTH     Right Foot Muscle Strength   Foot dorsiflexion:  4  Foot plantar flexion:  4  Foot inversion:  4  Foot eversion:  4     Left Foot Muscle Strength   Foot dorsiflexion:  4  Foot plantar flexion:  4  Foot inversion:  4  Foot eversion:  4    RANGE OF MOTION     Right Foot Range of Motion   Foot and ankle ROM within normal limits       Left Foot Range of Motion   Foot and ankle ROM within normal limits      DERMATOLOGIC      Right Foot Dermatologic   Skin  Right foot skin is intact.      Left Foot Dermatologic   Skin  Left foot skin is intact.       RADIOLOGY:        No results found.    ASSESSMENT/PLAN     Diagnoses and all orders for this visit:    1. Hallux valgus of right foot (Primary)  -     Ambulatory Referral For Orthotics    2. Arthritis of right foot        Comprehensive lower extremity examination and evaluation was performed.    Assessment & Plan    The bunion is likely secondary to the previous tib-fib fracture and the resulting alteration in his gait. Given the absence of pain, surgical intervention is not recommended at this time. Non-surgical options discussed include shoe gear modification and toe spacers. Surgical options were also discussed, including the potential for increased pain further back in the foot due to compensatory mechanisms related to the rotation at his tibia and fibula. He is agreeable to the plan. Inserts were discussed. If symptoms worsen, he will follow up to discuss surgical options.    Follow-up  The  patient will follow up as needed.    Discussed findings and treatment plan including risks, benefits, and treatment options with patient in detail. Patient agreed with treatment plan.    Medications and allergies reviewed.  Reviewed available lab values along with other pertinent labs.  These were discussed with the patient.    An After Visit Summary was printed and given to the patient at discharge, including (if requested) any available informative/educational handouts regarding diagnosis, treatment, or medications. All questions were answered to patient/family satisfaction. Should symptoms fail to improve or worsen they agree to call or return to clinic or to go to the Emergency Department. Discussed the importance of following up with any needed screening tests/labs/specialist appointments and any requested follow-up recommended by me today. Importance of maintaining follow-up discussed and patient accepts that missed appointments can delay diagnosis and potentially lead to worsening of conditions.    No follow-ups on file., or sooner if acute issues arise.      This document has been electronically signed by Feliberto Lake DPM on February 10, 2025 12:31 EST

## 2025-02-10 NOTE — PROGRESS NOTES
Physical Therapy Daily Treatment Note  [address] [place] [address]    Patient: Juvenal Issa   : 1965  Diagnosis/ICD-10 Code:  Chronic left-sided low back pain with left-sided sciatica [M54.42, G89.29]  Referring practitioner: Harvey Bennett PA-C  Date of Initial Visit: Type: THERAPY  Noted: 2025  Today's Date: 2/10/2025  Patient seen for 10 sessions           Subjective : Patient reports he felt much better following the dry needling. His leg feels better, feels more loose, and this has also helped his lower back feel better too.    Objective   See Exercise, Manual, and Modality Logs for complete treatment.       Assessment/Plan : Pt tolerated today's session well. Pt gives written and verbal consent with description of procedure and review of risks, benefits, precautions, contraindications, and potential side effects. Education provided for post needling soreness and expectations. All dry needling performed to appropriate depth with bony backdrop, or threading to avoid any anatomic structures that are contraindicated. No adverse events noted. Pt would benefit from continued skilled therapy to address deficits. Progress per plan of care.             Timed:  Manual Therapy:    8     mins  30401;  Therapeutic Exercise:    0     mins  10121;     Neuromuscular Ben:    0    mins  71544;    Therapeutic Activity:     0     mins  48892;     Gait Trainin     mins  17175;     Ultrasound:     0     mins  97824;    Aquatic Therapy    0     mins  12851;    Untimed:  Electrical Stimulation:    0     mins  91969 ( );  Dry Needling     16    mins self-pay;  Mechanical Traction    0     mins  79555  Moist Heat     0     mins  No charge  Canalith Repos              0     mins 15412    Timed Treatment:   8   mins   Total Treatment:     24   mins    Mio Whaley PT  Physical Therapist    Electronically signed 2/10/2025    [place] License: PT - [id number]

## 2025-02-13 ENCOUNTER — TREATMENT (OUTPATIENT)
Dept: PHYSICAL THERAPY | Facility: CLINIC | Age: 60
End: 2025-02-13
Payer: COMMERCIAL

## 2025-02-13 DIAGNOSIS — G89.29 CHRONIC LEFT-SIDED LOW BACK PAIN WITH LEFT-SIDED SCIATICA: Primary | ICD-10-CM

## 2025-02-13 DIAGNOSIS — M43.10 RETROLISTHESIS OF VERTEBRAE: ICD-10-CM

## 2025-02-13 DIAGNOSIS — M47.26 OSTEOARTHRITIS OF SPINE WITH RADICULOPATHY, LUMBAR REGION: ICD-10-CM

## 2025-02-13 DIAGNOSIS — M54.42 CHRONIC LEFT-SIDED LOW BACK PAIN WITH LEFT-SIDED SCIATICA: Primary | ICD-10-CM

## 2025-02-13 NOTE — PROGRESS NOTES
Physical Therapy Daily Treatment Note  [address] [place] [address]    Patient: Juvenal Issa   : 1965  Diagnosis/ICD-10 Code:  Chronic left-sided low back pain with left-sided sciatica [M54.42, G89.29]  Referring practitioner: Ronal Stinson DO  Date of Initial Visit: Type: THERAPY  Noted: 2025  Today's Date: 2025  Patient seen for 11 sessions           Subjective : Patient reports he is doing better, really feels like the dry needling has helped his left leg feel more loose. He is hoping to get into the gym to work on strengthening.    Objective   See Exercise, Manual, and Modality Logs for complete treatment.       Assessment/Plan : Pt tolerated today's session well. Updated exercises targeting stabilization, ongoing discussion of his progressive HEP. Will continue with dry needling as needed next visit or progression of exercises. Pt would benefit from continued skilled therapy to address deficits. Progress per plan of care.             Timed:  Manual Therapy:    0     mins  56259;  Therapeutic Exercise:    10     mins  06155;     Neuromuscular Ben:    0    mins  15872;    Therapeutic Activity:     14     mins  41711;     Gait Trainin     mins  69433;     Ultrasound:     0     mins  41070;    Aquatic Therapy    0     mins  70040;    Untimed:  Electrical Stimulation:    0     mins  17526 ( );  Dry Needling     0     mins self-pay;  Mechanical Traction    0     mins  37607  Moist Heat     0     mins  No charge  Canalith Repos              0     mins 67879    Timed Treatment:   24   mins   Total Treatment:     24   mins    Mio Whaley PT  Physical Therapist    Electronically signed 2025    [place] License: PT - [id number]

## 2025-02-17 ENCOUNTER — OFFICE VISIT (OUTPATIENT)
Dept: FAMILY MEDICINE CLINIC | Facility: CLINIC | Age: 60
End: 2025-02-17
Payer: COMMERCIAL

## 2025-02-17 VITALS
TEMPERATURE: 98.3 F | WEIGHT: 180.2 LBS | DIASTOLIC BLOOD PRESSURE: 100 MMHG | HEIGHT: 72 IN | SYSTOLIC BLOOD PRESSURE: 160 MMHG | BODY MASS INDEX: 24.41 KG/M2 | OXYGEN SATURATION: 97 % | HEART RATE: 114 BPM

## 2025-02-17 DIAGNOSIS — M21.611 BUNION OF GREAT TOE OF RIGHT FOOT: ICD-10-CM

## 2025-02-17 DIAGNOSIS — R03.0 ELEVATED BLOOD PRESSURE READING: ICD-10-CM

## 2025-02-17 DIAGNOSIS — M51.26 HERNIATED INTERVERTEBRAL DISC OF LUMBAR SPINE: Primary | ICD-10-CM

## 2025-02-17 DIAGNOSIS — M70.62 TROCHANTERIC BURSITIS OF LEFT HIP: ICD-10-CM

## 2025-02-17 DIAGNOSIS — Z12.11 SCREEN FOR COLON CANCER: ICD-10-CM

## 2025-02-17 DIAGNOSIS — M51.362 DEGENERATION OF INTERVERTEBRAL DISC OF LUMBAR REGION WITH DISCOGENIC BACK PAIN AND LOWER EXTREMITY PAIN: ICD-10-CM

## 2025-02-17 DIAGNOSIS — E78.2 MIXED HYPERLIPIDEMIA: ICD-10-CM

## 2025-02-17 DIAGNOSIS — Z98.1 S/P ANKLE FUSION: ICD-10-CM

## 2025-02-17 PROCEDURE — 99214 OFFICE O/P EST MOD 30 MIN: CPT | Performed by: STUDENT IN AN ORGANIZED HEALTH CARE EDUCATION/TRAINING PROGRAM
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
## 2025-02-18 ENCOUNTER — TREATMENT (OUTPATIENT)
Dept: PHYSICAL THERAPY | Facility: CLINIC | Age: 60
End: 2025-02-18
Payer: COMMERCIAL

## 2025-02-18 DIAGNOSIS — M47.26 OSTEOARTHRITIS OF SPINE WITH RADICULOPATHY, LUMBAR REGION: ICD-10-CM

## 2025-02-18 DIAGNOSIS — M43.10 RETROLISTHESIS OF VERTEBRAE: ICD-10-CM

## 2025-02-18 DIAGNOSIS — G89.29 CHRONIC LEFT-SIDED LOW BACK PAIN WITH LEFT-SIDED SCIATICA: Primary | ICD-10-CM

## 2025-02-18 DIAGNOSIS — M54.42 CHRONIC LEFT-SIDED LOW BACK PAIN WITH LEFT-SIDED SCIATICA: Primary | ICD-10-CM

## 2025-02-18 PROCEDURE — 97110 THERAPEUTIC EXERCISES: CPT | Performed by: PHYSICAL THERAPIST

## 2025-02-18 PROCEDURE — 97530 THERAPEUTIC ACTIVITIES: CPT | Performed by: PHYSICAL THERAPIST

## 2025-02-18 PROCEDURE — 97140 MANUAL THERAPY 1/> REGIONS: CPT | Performed by: PHYSICAL THERAPIST

## 2025-02-18 NOTE — PROGRESS NOTES
Physical Therapy Daily Treatment Note  [address] [place] [address]    Patient: Juvenal Issa   : 1965  Diagnosis/ICD-10 Code:  Chronic left-sided low back pain with left-sided sciatica [M54.42, G89.29]  Referring practitioner: Ronal Stinson DO  Date of Initial Visit: Type: THERAPY  Noted: 2025  Today's Date: 2025  Patient seen for 12 sessions           Subjective : Patient reports he is still doing well, he is having more right hip pain today and is wondering if his hip alignment is off again.    Objective   See Exercise, Manual, and Modality Logs for complete treatment.       Assessment/Plan : Pt tolerated today's session well. Corrected anterior innominate rotation, audible reduction of the right pelvis and pt reported reduced pain afterward. Further discussion of HEP and progression at the gym as well as review of self MET. Pt would benefit from continued skilled therapy to address deficits. Progress per plan of care.             Timed:  Manual Therapy:    15     mins  34179;  Therapeutic Exercise:    8     mins  90183;     Neuromuscular Ben:    0    mins  22965;    Therapeutic Activity:     8     mins  97324;     Gait Trainin     mins  87764;     Ultrasound:     0     mins  71618;    Aquatic Therapy    0     mins  75317;      Timed Treatment:   31   mins   Total Treatment:     31   mins    Mio Whaley PT  Physical Therapist    Electronically signed 2025    [place] License: PT - [id number]

## 2025-02-21 ENCOUNTER — TREATMENT (OUTPATIENT)
Dept: PHYSICAL THERAPY | Facility: CLINIC | Age: 60
End: 2025-02-21
Payer: COMMERCIAL

## 2025-02-21 DIAGNOSIS — M43.10 RETROLISTHESIS OF VERTEBRAE: ICD-10-CM

## 2025-02-21 DIAGNOSIS — M47.26 OSTEOARTHRITIS OF SPINE WITH RADICULOPATHY, LUMBAR REGION: ICD-10-CM

## 2025-02-21 DIAGNOSIS — G89.29 CHRONIC LEFT-SIDED LOW BACK PAIN WITH LEFT-SIDED SCIATICA: Primary | ICD-10-CM

## 2025-02-21 DIAGNOSIS — M54.42 CHRONIC LEFT-SIDED LOW BACK PAIN WITH LEFT-SIDED SCIATICA: Primary | ICD-10-CM

## 2025-02-21 PROCEDURE — 97140 MANUAL THERAPY 1/> REGIONS: CPT | Performed by: PHYSICAL THERAPIST

## 2025-02-21 PROCEDURE — 97110 THERAPEUTIC EXERCISES: CPT | Performed by: PHYSICAL THERAPIST

## 2025-02-21 NOTE — PROGRESS NOTES
Physical Therapy Daily Treatment Note  [address] [place] [address]    Patient: Juvenal Issa   : 1965  Diagnosis/ICD-10 Code:  Chronic left-sided low back pain with left-sided sciatica [M54.42, G89.29]  Referring practitioner: Harvey Bennett PA-C  Date of Initial Visit: Type: THERAPY  Noted: 2025  Today's Date: 2025  Patient seen for 13 sessions           Subjective : Patient reports he is still doing well, progressing his HEP.     Objective   See Exercise, Manual, and Modality Logs for complete treatment.       Assessment/Plan : Pt tolerated today's session well. Discussed ongoing progression of HEP and worked on hip alignment, improved with manual therapy today. Pt would benefit from continued skilled therapy to address deficits. Progress per plan of care.             Timed:  Manual Therapy:    10     mins  12710;  Therapeutic Exercise:    14     mins  71674;     Neuromuscular Ben:    0    mins  97728;    Therapeutic Activity:     0     mins  69914;     Gait Trainin     mins  01498;     Ultrasound:     0     mins  05476;    Aquatic Therapy    0     mins  74410;      Timed Treatment:   24   mins   Total Treatment:     24   mins    Mio Whaley PT  Physical Therapist    Electronically signed 2025    [place] License: PT - [id number]

## 2025-02-28 ENCOUNTER — TREATMENT (OUTPATIENT)
Dept: PHYSICAL THERAPY | Facility: CLINIC | Age: 60
End: 2025-02-28
Payer: COMMERCIAL

## 2025-02-28 DIAGNOSIS — G89.29 CHRONIC LEFT-SIDED LOW BACK PAIN WITH LEFT-SIDED SCIATICA: Primary | ICD-10-CM

## 2025-02-28 DIAGNOSIS — M54.42 CHRONIC LEFT-SIDED LOW BACK PAIN WITH LEFT-SIDED SCIATICA: Primary | ICD-10-CM

## 2025-02-28 DIAGNOSIS — M43.10 RETROLISTHESIS OF VERTEBRAE: ICD-10-CM

## 2025-02-28 DIAGNOSIS — M47.26 OSTEOARTHRITIS OF SPINE WITH RADICULOPATHY, LUMBAR REGION: ICD-10-CM

## 2025-02-28 NOTE — PROGRESS NOTES
Physical Therapy Daily Treatment Note  [address] [place] [address]    Patient: Juvenal Issa   : 1965  Diagnosis/ICD-10 Code:  Chronic left-sided low back pain with left-sided sciatica [M54.42, G89.29]  Referring practitioner: Ronal Stinson DO  Date of Initial Visit: Type: THERAPY  Noted: 2025  Today's Date: 2025  Patient seen for 14 sessions           Subjective : Patient reports his left hamstring has been bothering him more this week, not as bad this morning currently.     Objective   See Exercise, Manual, and Modality Logs for complete treatment.       Assessment/Plan : Pt tolerated today's session well. Pt gives written and verbal consent with description of procedure and review of risks, benefits, precautions, contraindications, and potential side effects. Education provided for post needling soreness and expectations. All dry needling performed to appropriate depth with bony backdrop, or threading to avoid any anatomic structures that are contraindicated. No adverse events noted. Additionally, with assessment of pelvic alignment, noted he has maintained his symmetry today. Pt would benefit from continued skilled therapy to address deficits. Progress per plan of care.             Timed:  Manual Therapy:    0     mins  37006;  Therapeutic Exercise:    16     mins  99536;     Neuromuscular Ben:    0    mins  65764;    Therapeutic Activity:     0     mins  52429;     Gait Trainin     mins  06761;     Ultrasound:     0     mins  16512;    Aquatic Therapy    0     mins  75202;    Untimed:  Electrical Stimulation:    0     mins  93026 (MC );  Dry Needling     6     mins self-pay;  Mechanical Traction    0     mins  85967  Moist Heat     0     mins  No charge  Canalith Repos              0     mins 07318    Timed Treatment:   16   mins   Total Treatment:     22   mins    Mio Whaley PT  Physical Therapist    Electronically signed 2025    [place] License: PT -  [id number]

## 2025-03-07 ENCOUNTER — TREATMENT (OUTPATIENT)
Dept: PHYSICAL THERAPY | Facility: CLINIC | Age: 60
End: 2025-03-07
Payer: COMMERCIAL

## 2025-03-07 DIAGNOSIS — G89.29 CHRONIC LEFT-SIDED LOW BACK PAIN WITH LEFT-SIDED SCIATICA: Primary | ICD-10-CM

## 2025-03-07 DIAGNOSIS — M43.10 RETROLISTHESIS OF VERTEBRAE: ICD-10-CM

## 2025-03-07 DIAGNOSIS — M47.26 OSTEOARTHRITIS OF SPINE WITH RADICULOPATHY, LUMBAR REGION: ICD-10-CM

## 2025-03-07 DIAGNOSIS — M54.42 CHRONIC LEFT-SIDED LOW BACK PAIN WITH LEFT-SIDED SCIATICA: Primary | ICD-10-CM

## 2025-03-07 NOTE — PROGRESS NOTES
Progress Assessment  [address] [place] [address]        Patient: Juvenal Issa   : 1965  Diagnosis/ICD-10 Code:  Chronic left-sided low back pain with left-sided sciatica [M54.42, G89.29]  Referring practitioner: Ronal Stinson DO  Date of Initial Visit: Type: THERAPY  Noted: 2025  Today's Date: 3/7/2025  Patient seen for 15 sessions      Subjective:     Subjective Questionnaire: Oswestry: 6%  Clinical Progress: improved  Home Program Compliance: Yes  Treatment has included: therapeutic exercise, neuromuscular re-education, manual therapy, and therapeutic activity    Subjective : Juvenal Issa reports: he has been doing better, continuing to progress with his HEP and recovery techniques. He did slip and fall in mud and twisted his leg and knee awkwardly. Doesn't feel like he truly injured himself, but is sore in his lower back.    Current Pain 2/10      Objective       Postural Observations     Additional Postural Observation Details  Decreased lumbar lordosis     Tenderness      Left Hip   Tenderness in the PSIS.      Right Hip   Tenderness in the PSIS.      Additional Tenderness Details  Lumbar spine hypomobile, mild pain     Neurological Testing      Sensation      Lumbar   Left   Intact: light touch     Right   Intact: light touch     Reflexes   Left   Patellar (L4): trace (1+)  Achilles (S1): trace (1+)     Right   Patellar (L4): normal (2+)  Achilles (S1): normal (2+)     Active Range of Motion      Lumbar   Flexion: Active lumbar flexion: to distal shin, L hamstring tightness.   Extension: Active lumbar extension: limited 25%   Left lateral flexion: Active left lumbar lateral flexion: limited 25%   Right lateral flexion: Active right lumbar lateral flexion: limited 25%   Left rotation: WFL  Right rotation: WFL     Strength/Myotome Testing      Left Hip   Planes of Motion   Flexion: 4  Extension: 4  Abduction: 4  Adduction: 4     Right Hip   Planes of Motion   Flexion: 4+  Extension:  4+  Abduction: 4+  Adduction: 4+     Tests         Thoracic   Positive slump.      Lumbar   Positive repeated extension.      Left   Negative crossed SLR.      Right   Negative crossed SLR.      Ambulation      Comments   Right ankle eversion, supination, increased valgus curvature of R tibia          Assessment/Plan    Assessment details: The patient presents to physical therapy with complaints of low back pain with radiation into the left lower extremity with weakness and functional gait changes. Pt has responded well to manual therapy interventions for posture correction and this has helped correlate with improved function and less pain. He is continually working on his HEP and recovery techniques. He is doing well at this point, plan to reduce his frequency to one time per week. The patient presents with associated lower extremity weakness, lumbar stiffness, and functional deficits (OSWESTRY). The patient would benefit from skilled PT intervention to address the above mentioned functional limitations.      Prognosis: good     Goals  Plan Goals: LOW BACK PROBLEMS:     1. The patient complains of low back pain.  LTG 1: 12 weeks:  The patient will report a pain rating of 3/10 or better at worst in order to improve tolerance to activities of daily living and improve sleep quality.              STATUS:  MET currently  STG 1a: 6 weeks:  The patient will report a pain rating of 5/10 or better at its worst.              STATUS:  MET currently     LTG 1b: 12 weeks:  The patient will report a pain rating no greater than 2/10 or better at worst for THREE WEEKS in order to improve tolerance to activities of daily living and improve sleep quality.              STATUS:  Not met           2. The patient demonstrates weakness of the left hip.  LTG 2: 12 weeks:  The patient will demonstrate 5 /5 strength for L hip flexion, abduction,  and extension in order to improve hip stability.              STATUS:  Not met  STG 2a: 6  weeks:  The patient will demonstrate 4+ /5 strength for L hip flexion, abduction,  and extension.              STATUS:  Not met     LTG 2b: 12 weeks:  The patient will demonstrate full gym routine, pain free for THREE WEEKS in order to demonstrate fully established and progressed HEP for the long term and return to his prior status.              STATUS:  Not met        3. Mobility: Walking/Moving Around Functional Limitation                   LTG 3: 12 weeks:  The patient will demonstrate 1-19 % limitation by achieving a score of 1-9 on the SUZANNE.              STATUS:  MET currently  STG 3a: The patient will be independent with HEP.                STATUS: MET           4. The patient has limited lumbar AROM  LTG 4: 12 weeks:  The patient will demonstrate lumbar AROM as follows: fingertips to distal shin for flexion and no limitation for extension.              STATUS: MET currently           5. The patient reports radicular symptoms in the left lower extremity.  LTG 5: 12 weeks:  The patient will report a decrease in radicular symptoms in the L lower extremity by 50%.              STATUS:  MET  STG 5a: 6 weeks:  The patient will report a decrease in radicular symptoms in the L lower extremity by 25%.              STATUS:  MET       Progress toward previous goals: Partially Met    See Exercise, Manual, and Modality Logs for complete treatment.         Recommendations: Continue with recommendations: Reduce frequency to 1x/week  Timeframe: 1 month  Prognosis to achieve goals: good    PT Signature: Mio Whaley PT    Electronically signed 3/7/2025    [place] License: PT - [id number]     Based upon review of the patient's progress and continued therapy plan, it is my medical opinion that Juvenal Issa should continue physical therapy treatment at [facility]  [address] [place] [address] [phone].      Timed:         Manual Therapy:    10     mins  40593;     Therapeutic  Exercise:    12     mins  54888;     Neuromuscular Ben:    0    mins  27954;    Therapeutic Activity:     0     mins  92698;     Gait Trainin     mins  41281;     Ultrasound:     0     mins  69749;    Self Care                       0     mins   66849  Aquatic                          0     mins 02374              Timed Treatment:   22   mins   Total Treatment:     32   mins      I certify that the therapy services are furnished while this patient is under my care.  The services outlined above are required by this patient, and will be reviewed every 90 days.

## 2025-03-14 ENCOUNTER — TREATMENT (OUTPATIENT)
Dept: PHYSICAL THERAPY | Facility: CLINIC | Age: 60
End: 2025-03-14
Payer: COMMERCIAL

## 2025-03-14 DIAGNOSIS — M47.26 OSTEOARTHRITIS OF SPINE WITH RADICULOPATHY, LUMBAR REGION: ICD-10-CM

## 2025-03-14 DIAGNOSIS — M43.10 RETROLISTHESIS OF VERTEBRAE: ICD-10-CM

## 2025-03-14 DIAGNOSIS — G89.29 CHRONIC LEFT-SIDED LOW BACK PAIN WITH LEFT-SIDED SCIATICA: Primary | ICD-10-CM

## 2025-03-14 DIAGNOSIS — M54.42 CHRONIC LEFT-SIDED LOW BACK PAIN WITH LEFT-SIDED SCIATICA: Primary | ICD-10-CM

## 2025-03-14 NOTE — PROGRESS NOTES
Physical Therapy Daily Treatment Note  [address] [place] [address]    Patient: Juvenal Issa   : 1965  Diagnosis/ICD-10 Code:  Chronic left-sided low back pain with left-sided sciatica [M54.42, G89.29]  Referring practitioner: Ronal Stinson DO  Date of Initial Visit: Type: THERAPY  Noted: 2025  Today's Date: 3/14/2025  Patient seen for 16 sessions           Subjective : Patient reports he has been doing better. He has been able to walk more with the nicer weather.    Objective   See Exercise, Manual, and Modality Logs for complete treatment.       Assessment/Plan : Pt tolerated today's session well. Manual therapy performed for correcting leg length discrepancy noted today. Updated HEP to include more hip and pelvic stability. Pt would benefit from continued skilled therapy to address deficits. Progress per plan of care.             Timed:  Manual Therapy:    14     mins  65693;  Therapeutic Exercise:    10     mins  19515;     Neuromuscular Ben:    0    mins  51200;    Therapeutic Activity:     0     mins  79845;     Gait Trainin     mins  69197;     Ultrasound:     0     mins  41166;    Aquatic Therapy    0     mins  69662;    Untimed:  Electrical Stimulation:    0     mins  55317 ( );  Dry Needling     0     mins self-pay;  Mechanical Traction    0     mins  43917  Moist Heat     0     mins  No charge  Canalith Repos              0     mins 81956    Timed Treatment:   24   mins   Total Treatment:     24   mins    Mio Whaley PT  Physical Therapist    Electronically signed 3/14/2025    [place] License: PT - [id number]

## 2025-03-21 ENCOUNTER — TREATMENT (OUTPATIENT)
Dept: PHYSICAL THERAPY | Facility: CLINIC | Age: 60
End: 2025-03-21
Payer: COMMERCIAL

## 2025-03-21 DIAGNOSIS — M47.26 OSTEOARTHRITIS OF SPINE WITH RADICULOPATHY, LUMBAR REGION: ICD-10-CM

## 2025-03-21 DIAGNOSIS — M54.42 CHRONIC LEFT-SIDED LOW BACK PAIN WITH LEFT-SIDED SCIATICA: Primary | ICD-10-CM

## 2025-03-21 DIAGNOSIS — G89.29 CHRONIC LEFT-SIDED LOW BACK PAIN WITH LEFT-SIDED SCIATICA: Primary | ICD-10-CM

## 2025-03-21 DIAGNOSIS — M43.10 RETROLISTHESIS OF VERTEBRAE: ICD-10-CM

## 2025-03-21 NOTE — PROGRESS NOTES
Physical Therapy Daily Treatment Note  [address] [place] [address]    Patient: Juvenal Issa   : 1965  Diagnosis/ICD-10 Code:  Chronic left-sided low back pain with left-sided sciatica [M54.42, G89.29]  Referring practitioner: Ronal Stinson DO  Date of Initial Visit: Type: THERAPY  Noted: 2025  Today's Date: 3/21/2025  Patient seen for 17 sessions           Subjective : Patient reports he is doing well, felt a good release in his mid to lower back with doing some dead hangs.    Objective   See Exercise, Manual, and Modality Logs for complete treatment.       Assessment/Plan : Pt tolerated today's session well. Corrected hip alignment which had maintained better than last week. Pt would benefit from continued skilled therapy to address deficits. Progress per plan of care.             Timed:  Manual Therapy:    10     mins  87824;  Therapeutic Exercise:    12     mins  96945;     Neuromuscular Ben:    0    mins  16172;    Therapeutic Activity:     0     mins  43764;     Gait Trainin     mins  36392;     Ultrasound:     0     mins  72272;    Aquatic Therapy    0     mins  47738;    Untimed:  Electrical Stimulation:    0     mins  92121 ( );  Dry Needling     0     mins self-pay;  Mechanical Traction    0     mins  50627  Moist Heat     0     mins  No charge  Canalith Repos              0     mins 79741    Timed Treatment:   22   mins   Total Treatment:     22   mins    Mio Whaley PT  Physical Therapist    Electronically signed 3/21/2025    [place] License: PT - [id number]

## 2025-03-28 ENCOUNTER — TREATMENT (OUTPATIENT)
Dept: PHYSICAL THERAPY | Facility: CLINIC | Age: 60
End: 2025-03-28
Payer: COMMERCIAL

## 2025-03-28 DIAGNOSIS — M43.10 RETROLISTHESIS OF VERTEBRAE: ICD-10-CM

## 2025-03-28 DIAGNOSIS — M47.26 OSTEOARTHRITIS OF SPINE WITH RADICULOPATHY, LUMBAR REGION: ICD-10-CM

## 2025-03-28 DIAGNOSIS — G89.29 CHRONIC LEFT-SIDED LOW BACK PAIN WITH LEFT-SIDED SCIATICA: Primary | ICD-10-CM

## 2025-03-28 DIAGNOSIS — M54.42 CHRONIC LEFT-SIDED LOW BACK PAIN WITH LEFT-SIDED SCIATICA: Primary | ICD-10-CM

## 2025-03-28 NOTE — PROGRESS NOTES
Physical Therapy Daily Treatment Note  [address] [place] [address]    Patient: Juvenal Issa   : 1965  Diagnosis/ICD-10 Code:  Chronic left-sided low back pain with left-sided sciatica [M54.42, G89.29]  Referring practitioner: Ronal Stinson DO  Date of Initial Visit: Type: THERAPY  Noted: 2025  Today's Date: 3/28/2025  Patient seen for 18 sessions           Subjective : Patient reports he felt a lot of pain in his lower back and left hip after the last session, but symptoms resolved during the middle of the week.    Objective   See Exercise, Manual, and Modality Logs for complete treatment.       Assessment/Plan : Pt tolerated today's session well. Leg length appears maintained. Decreased vertical translation during gait today. Reviewed and updated HEP for progression. Pt would benefit from continued skilled therapy to address deficits. Progress per plan of care.             Timed:  Manual Therapy:    0     mins  27118;  Therapeutic Exercise:    25     mins  18315;     Neuromuscular Ben:    0    mins  84502;    Therapeutic Activity:     0     mins  01145;     Gait Trainin     mins  62116;     Ultrasound:     0     mins  50761;    Aquatic Therapy    0     mins  92787;      Timed Treatment:   25   mins   Total Treatment:     25   mins    Mio Whaley PT  Physical Therapist    Electronically signed 3/28/2025    [place] License: PT - [id number]

## 2025-04-01 ENCOUNTER — TREATMENT (OUTPATIENT)
Dept: PHYSICAL THERAPY | Facility: CLINIC | Age: 60
End: 2025-04-01
Payer: COMMERCIAL

## 2025-04-01 DIAGNOSIS — M43.10 RETROLISTHESIS OF VERTEBRAE: ICD-10-CM

## 2025-04-01 DIAGNOSIS — M47.26 OSTEOARTHRITIS OF SPINE WITH RADICULOPATHY, LUMBAR REGION: ICD-10-CM

## 2025-04-01 DIAGNOSIS — G89.29 CHRONIC LEFT-SIDED LOW BACK PAIN WITH LEFT-SIDED SCIATICA: Primary | ICD-10-CM

## 2025-04-01 DIAGNOSIS — M54.42 CHRONIC LEFT-SIDED LOW BACK PAIN WITH LEFT-SIDED SCIATICA: Primary | ICD-10-CM

## 2025-04-01 PROCEDURE — 97110 THERAPEUTIC EXERCISES: CPT | Performed by: PHYSICAL THERAPIST

## 2025-04-01 NOTE — PROGRESS NOTES
Physical Therapy Daily Treatment Note  [address] [place] [address]    Patient: Juvenal Issa   : 1965  Diagnosis/ICD-10 Code:  Chronic left-sided low back pain with left-sided sciatica [M54.42, G89.29]  Referring practitioner: Ronal Stinson DO  Date of Initial Visit: Type: THERAPY  Noted: 2025  Today's Date: 2025  Patient seen for 19 sessions           Subjective : Patient reports he has been doing better this week. He feels like his left hamstring is getting a little sore.    Objective   See Exercise, Manual, and Modality Logs for complete treatment.       Assessment/Plan : Pt tolerated today's session well. Pt demonstrates improved gait, no apparent leg length discrepancy. Pt would benefit from continued skilled therapy to address deficits. Progress per plan of care.             Timed:  Manual Therapy:    0     mins  52971;  Therapeutic Exercise:    25     mins  37225;     Neuromuscular Ben:    0    mins  22539;    Therapeutic Activity:     0     mins  25034;     Gait Trainin     mins  62418;     Ultrasound:     0     mins  53988;    Aquatic Therapy    0     mins  25107;        Timed Treatment:   25   mins   Total Treatment:     25   mins    Mio Whaley PT  Physical Therapist    Electronically signed 2025    [place] License: PT - [id number]

## 2025-04-17 ENCOUNTER — TREATMENT (OUTPATIENT)
Dept: PHYSICAL THERAPY | Facility: CLINIC | Age: 60
End: 2025-04-17
Payer: COMMERCIAL

## 2025-04-17 DIAGNOSIS — M43.10 RETROLISTHESIS OF VERTEBRAE: ICD-10-CM

## 2025-04-17 DIAGNOSIS — M54.42 CHRONIC LEFT-SIDED LOW BACK PAIN WITH LEFT-SIDED SCIATICA: Primary | ICD-10-CM

## 2025-04-17 DIAGNOSIS — M47.26 OSTEOARTHRITIS OF SPINE WITH RADICULOPATHY, LUMBAR REGION: ICD-10-CM

## 2025-04-17 DIAGNOSIS — G89.29 CHRONIC LEFT-SIDED LOW BACK PAIN WITH LEFT-SIDED SCIATICA: Primary | ICD-10-CM

## 2025-04-17 NOTE — PROGRESS NOTES
Re-Assessment / Re-Certification  [address] [place] [address]      Patient: Juvenal Issa   : 1965  Diagnosis/ICD-10 Code:  Chronic left-sided low back pain with left-sided sciatica [M54.42, G89.29]  Referring practitioner: Harvey Bennett PA-C  Date of Initial Visit: Type: THERAPY  Noted: 2025  Today's Date: 2025  Patient seen for 20 sessions      Subjective:   Subjective Questionnaire: Oswestry: 6%  Clinical Progress: improved  Home Program Compliance: Yes  Treatment has included: therapeutic exercise, neuromuscular re-education, manual therapy, and therapeutic activity    Subjective : Juvenal Issa reports: he is doing well. He has continued to work on his hip stretching and lower back activities. Feeling like he will be able to try more in the gym starting next week.     Current Pain 2/10      Objective     Postural Observations     Additional Postural Observation Details  Decreased lumbar lordosis     Tenderness      Left Hip   Tenderness in the PSIS.      Right Hip   Tenderness in the PSIS.      Additional Tenderness Details  Lumbar spine hypomobile, mild pain          Reflexes   Left   Patellar (L4): trace (1+)  Achilles (S1): trace (1+)     Right   Patellar (L4): normal (2+)  Achilles (S1): normal (2+)     Active Range of Motion      Lumbar   Flexion: Active lumbar flexion: to distal shin, L hamstring tightness.   Extension: Active lumbar extension: limited 25%   Left lateral flexion: Active left lumbar lateral flexion: limited 25%   Right lateral flexion: Active right lumbar lateral flexion: limited 25%   Left rotation: WFL  Right rotation: WFL     Strength/Myotome Testing      Left Hip   Planes of Motion   Flexion: 4+  Extension: 4  Abduction: 4+  Adduction: 4     Right Hip   Planes of Motion   Flexion: 4+  Extension: 4+  Abduction: 4+  Adduction: 4+     Tests         Thoracic   Positive slump.      Lumbar   Positive repeated extension.      Left   Negative crossed SLR.       Right   Negative crossed SLR.      Ambulation      Comments   Right ankle eversion, supination, increased valgus curvature of R tibia        See Exercise, Manual, and Modality Logs for complete treatment.     Assessment/Plan    Assessment details: The patient presents to physical therapy with complaints of low back pain with radiation into the left lower extremity with weakness and functional gait changes. Pt has responded well to manual therapy interventions for posture correction and this has helped correlate with improved function and less pain. He is continually working on his HEP and recovery techniques. He is showing improvements in his left lower extremity, though not currently meeting the threshold for his goal. The patient presents with associated lower extremity weakness, lumbar stiffness, and functional deficits (OSWESTRY). The patient would benefit from skilled PT intervention to address the above mentioned functional limitations.      Prognosis: good     Goals  Plan Goals: LOW BACK PROBLEMS:     1. The patient complains of low back pain.  LTG 1: 12 weeks:  The patient will report a pain rating of 3/10 or better at worst in order to improve tolerance to activities of daily living and improve sleep quality.              STATUS:  MET currently  STG 1a: 6 weeks:  The patient will report a pain rating of 5/10 or better at its worst.              STATUS:  MET currently     LTG 1b: 12 weeks:  The patient will report a pain rating no greater than 2/10 or better at worst for THREE WEEKS in order to improve tolerance to activities of daily living and improve sleep quality.              STATUS:  Not met           2. The patient demonstrates weakness of the left hip.  LTG 2: 12 weeks:  The patient will demonstrate 5 /5 strength for L hip flexion, abduction,  and extension in order to improve hip stability.              STATUS:  Not met  STG 2a: 6 weeks:  The patient will demonstrate 4+ /5 strength for L hip  flexion, abduction,  and extension.              STATUS:  Not met (progressing well)     LTG 2b: 12 weeks:  The patient will demonstrate full gym routine, pain free for THREE WEEKS in order to demonstrate fully established and progressed HEP for the long term and return to his prior status.              STATUS:  Not met        3. Mobility: Walking/Moving Around Functional Limitation                   LTG 3: 12 weeks:  The patient will demonstrate 1-19 % limitation by achieving a score of 1-9 on the SUZANNE.              STATUS:  MET currently  STG 3a: The patient will be independent with HEP.                STATUS: MET           4. The patient has limited lumbar AROM  LTG 4: 12 weeks:  The patient will demonstrate lumbar AROM as follows: fingertips to distal shin for flexion and no limitation for extension.              STATUS: MET currently           5. The patient reports radicular symptoms in the left lower extremity.  LTG 5: 12 weeks:  The patient will report a decrease in radicular symptoms in the L lower extremity by 50%.              STATUS:  MET  STG 5a: 6 weeks:  The patient will report a decrease in radicular symptoms in the L lower extremity by 25%.              STATUS:  MET          Progress toward previous goals: Partially Met        Recommendations: Continue as planned  Timeframe: 3 months  Prognosis to achieve goals: good    PT Signature: Mio Whaley PT    Electronically signed 4/17/2025    [place] License: PT - [id number]     Based upon review of the patient's progress and continued therapy plan, it is my medical opinion that Juvenal Issa should continue physical therapy treatment at [facility]  [address]  [place] [id number] [phone].    Signature: __________________________________  Harvey Bennett PA-C  NPI: [id number]         90 Day Recertification  Certification Period: 4/17/2025 thru 7/15/2025  I certify that the therapy services are  furnished while this patient is under my care.  The services outlined above are required by this patient, and will be reviewed every 90 days.      Please sign and return via fax to [phone]. Thank you, [facility].    Timed:  Manual Therapy:    0     mins  90810;  Therapeutic Exercise:    10     mins  33397;     Neuromuscular Ben:    0    mins  33107;    Therapeutic Activity:     0     mins  64477;     Gait Trainin     mins  69136;     Ultrasound:     0     mins  40066;    Aquatic Therapy    0     mins  15287    Untimed:  Electrical Stimulation:    0     mins  35688 ( );  Dry Needling     0     mins self-pay  Canalith Repos    0     mins 64375  Moist Heat     0     mins No charge      Timed Treatment:   10   mins   Total Treatment:     25   mins

## 2025-04-24 ENCOUNTER — TREATMENT (OUTPATIENT)
Dept: PHYSICAL THERAPY | Facility: CLINIC | Age: 60
End: 2025-04-24
Payer: COMMERCIAL

## 2025-04-24 DIAGNOSIS — G89.29 CHRONIC LEFT-SIDED LOW BACK PAIN WITH LEFT-SIDED SCIATICA: Primary | ICD-10-CM

## 2025-04-24 DIAGNOSIS — M54.42 CHRONIC LEFT-SIDED LOW BACK PAIN WITH LEFT-SIDED SCIATICA: Primary | ICD-10-CM

## 2025-04-24 DIAGNOSIS — M43.10 RETROLISTHESIS OF VERTEBRAE: ICD-10-CM

## 2025-04-24 DIAGNOSIS — M47.26 OSTEOARTHRITIS OF SPINE WITH RADICULOPATHY, LUMBAR REGION: ICD-10-CM

## 2025-04-24 NOTE — PROGRESS NOTES
Physical Therapy Daily Treatment Note  [address] [place] [address]    Patient: Juvenal Issa   : 1965  Diagnosis/ICD-10 Code:  Chronic left-sided low back pain with left-sided sciatica [M54.42, G89.29]  Referring practitioner: Harvey Bennett PA-C  Date of Initial Visit: Type: THERAPY  Noted: 2025  Today's Date: 2025  Patient seen for 21 sessions           Subjective : Patient reports he is doing well, feels like he is off a little bit in his hips.    Objective   See Exercise, Manual, and Modality Logs for complete treatment.       Assessment/Plan : Pt tolerated today's session well. Found to have slight pelvic asymmetry, corrected with manual technique. Pt would benefit from continued skilled therapy to address deficits. Progress per plan of care.             Timed:  Manual Therapy:    8     mins  41500;  Therapeutic Exercise:    14     mins  65310;     Neuromuscular Ben:    0    mins  87604;    Therapeutic Activity:     0     mins  87793;     Gait Trainin     mins  11037;     Ultrasound:     0     mins  05343;    Aquatic Therapy    0     mins  16555;        Timed Treatment:   22   mins   Total Treatment:     22   mins    Mio Whaley PT  Physical Therapist    Electronically signed 2025    [place] License: PT - [id number]

## 2025-04-30 ENCOUNTER — TREATMENT (OUTPATIENT)
Dept: PHYSICAL THERAPY | Facility: CLINIC | Age: 60
End: 2025-04-30
Payer: COMMERCIAL

## 2025-04-30 DIAGNOSIS — M43.10 RETROLISTHESIS OF VERTEBRAE: ICD-10-CM

## 2025-04-30 DIAGNOSIS — G89.29 CHRONIC LEFT-SIDED LOW BACK PAIN WITH LEFT-SIDED SCIATICA: Primary | ICD-10-CM

## 2025-04-30 DIAGNOSIS — M54.42 CHRONIC LEFT-SIDED LOW BACK PAIN WITH LEFT-SIDED SCIATICA: Primary | ICD-10-CM

## 2025-04-30 DIAGNOSIS — M47.26 OSTEOARTHRITIS OF SPINE WITH RADICULOPATHY, LUMBAR REGION: ICD-10-CM

## 2025-04-30 NOTE — PROGRESS NOTES
Physical Therapy Daily Treatment Note  [address] [place] [address]    Patient: Juvenal Issa   : 1965  Diagnosis/ICD-10 Code:  Chronic left-sided low back pain with left-sided sciatica [M54.42, G89.29]  Referring practitioner: Harvey Bennett PA-C  Date of Initial Visit: Type: THERAPY  Noted: 2025  Today's Date: 2025  Patient seen for 22 sessions           Subjective : Patient reports he is doing better this week. Feels like he is staying better aligned.     Objective   See Exercise, Manual, and Modality Logs for complete treatment.       Assessment/Plan : Pt tolerated today's session well. Thorough discussion of activities outside of therapy including progression of walking and strengthening activities. With assessment, pt appears to have maintained good alignment over the course of the last week. Pt would benefit from continued skilled therapy to address deficits. Progress per plan of care.             Timed:  Manual Therapy:    0     mins  37046;  Therapeutic Exercise:    25     mins  40677;     Neuromuscular Ben:    0    mins  37257;    Therapeutic Activity:     0     mins  44142;     Gait Trainin     mins  87242;     Ultrasound:     0     mins  19747;    Aquatic Therapy    0     mins  45561;        Timed Treatment:   25   mins   Total Treatment:     25   mins    Mio Whaley PT  Physical Therapist    Electronically signed 2025    [place] License: PT - [id number]

## 2025-05-08 ENCOUNTER — TREATMENT (OUTPATIENT)
Dept: PHYSICAL THERAPY | Facility: CLINIC | Age: 60
End: 2025-05-08
Payer: COMMERCIAL

## 2025-05-08 DIAGNOSIS — G89.29 CHRONIC LEFT-SIDED LOW BACK PAIN WITH LEFT-SIDED SCIATICA: Primary | ICD-10-CM

## 2025-05-08 DIAGNOSIS — M47.26 OSTEOARTHRITIS OF SPINE WITH RADICULOPATHY, LUMBAR REGION: ICD-10-CM

## 2025-05-08 DIAGNOSIS — M54.42 CHRONIC LEFT-SIDED LOW BACK PAIN WITH LEFT-SIDED SCIATICA: Primary | ICD-10-CM

## 2025-05-08 DIAGNOSIS — M43.10 RETROLISTHESIS OF VERTEBRAE: ICD-10-CM

## 2025-05-08 NOTE — PROGRESS NOTES
Physical Therapy Daily Treatment Note  [address] [place] [address]    Patient: Juvenal Issa   : 1965  Diagnosis/ICD-10 Code:  Chronic left-sided low back pain with left-sided sciatica [M54.42, G89.29]  Referring practitioner: Harvey Bennett PA-C  Date of Initial Visit: Type: THERAPY  Noted: 2025  Today's Date: 2025  Patient seen for 23 sessions           Subjective : Patient reports he has been doing well with his lower back, he can tell he is off a little at his hips today.     Objective   See Exercise, Manual, and Modality Logs for complete treatment.       Assessment/Plan : Pt tolerated today's session well. Apparent LLD found with assessment, corrected with MET. Reviewed and discussed ongoing progression of HEP and pain management at home. Pt would benefit from continued skilled therapy to address deficits. Progress per plan of care.             Timed:  Manual Therapy:    8     mins  47011;  Therapeutic Exercise:    14     mins  44317;     Neuromuscular Ben:    0    mins  86661;    Therapeutic Activity:     0     mins  11186;     Gait Trainin     mins  66336;     Ultrasound:     0     mins  04443;    Aquatic Therapy    0     mins  49417;    Untimed:  Electrical Stimulation:    0     mins  67981 ( );  Dry Needling     0     mins self-pay;  Mechanical Traction    0     mins  82856  Moist Heat     0     mins  No charge  Canalith Repos              0     mins 28569    Timed Treatment:   22   mins   Total Treatment:     22   mins    Mio Whaley PT  Physical Therapist    Electronically signed 2025    [place] License: PT - [id number]

## 2025-05-22 ENCOUNTER — TREATMENT (OUTPATIENT)
Dept: PHYSICAL THERAPY | Facility: CLINIC | Age: 60
End: 2025-05-22
Payer: COMMERCIAL

## 2025-05-22 DIAGNOSIS — M47.26 OSTEOARTHRITIS OF SPINE WITH RADICULOPATHY, LUMBAR REGION: ICD-10-CM

## 2025-05-22 DIAGNOSIS — M43.10 RETROLISTHESIS OF VERTEBRAE: ICD-10-CM

## 2025-05-22 DIAGNOSIS — M54.42 CHRONIC LEFT-SIDED LOW BACK PAIN WITH LEFT-SIDED SCIATICA: Primary | ICD-10-CM

## 2025-05-22 DIAGNOSIS — G89.29 CHRONIC LEFT-SIDED LOW BACK PAIN WITH LEFT-SIDED SCIATICA: Primary | ICD-10-CM

## 2025-05-22 NOTE — PROGRESS NOTES
Progress Assessment  [address] [place] [address]        Patient: Juvenal Issa   : 1965  Diagnosis/ICD-10 Code:  Chronic left-sided low back pain with left-sided sciatica [M54.42, G89.29]  Referring practitioner: Harvey Bennett PA-C  Date of Initial Visit: Type: THERAPY  Noted: 2025  Today's Date: 2025  Patient seen for 24 sessions      Subjective:     Subjective Questionnaire: Oswestry: 5%  Clinical Progress: improved  Home Program Compliance: Yes  Treatment has included: therapeutic exercise, neuromuscular re-education, manual therapy, and dry needling    Subjective : Juvenal Issa reports: he is doing well overall, he is feeling slightly off today. He has continued to use the pull up bar to decompress his back.    Current Pain 2/10      Objective  Postural Observations     Additional Postural Observation Details  Decreased lumbar lordosis, apparent left leg longer (corrected with MET)         Active Range of Motion      Lumbar   Flexion: Active lumbar flexion: to distal shin, L hamstring tightness.   Extension: Active lumbar extension: limited 25%   Left lateral flexion: Active left lumbar lateral flexion: limited 25%   Right lateral flexion: Active right lumbar lateral flexion: limited 25%   Left rotation: WFL  Right rotation: WFL     Strength/Myotome Testing      Left Hip   Planes of Motion   Flexion: 4+  Extension: 4+  Abduction: 4+  Adduction: 4+     Right Hip   Planes of Motion   Flexion: 4+  Extension: 4+  Abduction: 4+  Adduction: 4+     Tests         Thoracic   Positive slump.            Ambulation      Comments   Right ankle eversion, supination, increased valgus curvature of R tibia        Assessment/Plan    Assessment details: The patient presents to physical therapy with complaints of low back pain with radiation into the left lower extremity with weakness and functional gait changes. Pt has responded well to manual therapy interventions for posture correction and  this has helped correlate with improved function and less pain. He is continually working on his HEP and recovery techniques. He has met his short term goal for LE strength today and is expected to continue to make improvements. The patient presents with associated lower extremity weakness, lumbar stiffness, and functional deficits (OSWESTRY). The patient would benefit from skilled PT intervention to address the above mentioned functional limitations.      Prognosis: good     Goals  Plan Goals: LOW BACK PROBLEMS:     1. The patient complains of low back pain.  LTG 1: 12 weeks:  The patient will report a pain rating of 3/10 or better at worst in order to improve tolerance to activities of daily living and improve sleep quality.              STATUS:  MET currently  STG 1a: 6 weeks:  The patient will report a pain rating of 5/10 or better at its worst.              STATUS:  MET currently     LTG 1b: 12 weeks:  The patient will report a pain rating no greater than 2/10 or better at worst for THREE WEEKS in order to improve tolerance to activities of daily living and improve sleep quality.              STATUS:  Not met           2. The patient demonstrates weakness of the left hip.  LTG 2: 12 weeks:  The patient will demonstrate 5 /5 strength for L hip flexion, abduction, and extension in order to improve hip stability.              STATUS:  Not met  STG 2a: 6 weeks:  The patient will demonstrate 4+ /5 strength for L hip flexion, abduction,and extension.              STATUS:  MET     LTG 2b: 12 weeks:  The patient will demonstrate full gym routine, pain free for THREE WEEKS in order to demonstrate fully established and progressed HEP for the long term and return to his prior status.              STATUS:  Not met, progressing        3. Mobility: Walking/Moving Around Functional Limitation                   LTG 3: 12 weeks:  The patient will demonstrate 1-19 % limitation by achieving a score of 1-9 on the SUZANNE.               STATUS:  MET currently  STG 3a: The patient will be independent with HEP.                STATUS: MET           4. The patient has limited lumbar AROM  LTG 4: 12 weeks:  The patient will demonstrate lumbar AROM as follows: fingertips to distal shin for flexion and no limitation for extension.              STATUS: MET currently           5. The patient reports radicular symptoms in the left lower extremity.  LTG 5: 12 weeks:  The patient will report a decrease in radicular symptoms in the L lower extremity by 50%.              STATUS:  MET  STG 5a: 6 weeks:  The patient will report a decrease in radicular symptoms in the L lower extremity by 25%.              STATUS:  MET  Progress toward previous goals: Partially Met    See Exercise, Manual, and Modality Logs for complete treatment.         Recommendations: Continue as planned  Timeframe: 1 month  Prognosis to achieve goals: good    PT Signature: Mio Whaley PT    Electronically signed 2025    [place] License: PT - [id number]     Based upon review of the patient's progress and continued therapy plan, it is my medical opinion that Juvenal Issa should continue physical therapy treatment at [facility] PHYSICAL THERAPY  [address] [facility] [address] [phone].      Timed:         Manual Therapy:    8     mins  25824;     Therapeutic Exercise:    10     mins  26253;     Neuromuscular Ben:    0    mins  29628;    Therapeutic Activity:     12     mins  64428;     Gait Trainin     mins  03792;     Ultrasound:     0     mins  81979;    Self Care                       0     mins   16048  Aquatic                          0     mins 57112      Un-Timed:  Electrical Stimulation:    0     mins  56044 ( );  Dry Needling     0     mins self-pay  Canalith Repos    0     mins 92394  Traction     0     mins 98434        Timed Treatment:   30   mins   Total Treatment:     30   mins      I certify that the therapy services are  furnished while this patient is under my care.  The services outlined above are required by this patient, and will be reviewed every 90 days.

## 2025-05-29 ENCOUNTER — TREATMENT (OUTPATIENT)
Dept: PHYSICAL THERAPY | Facility: CLINIC | Age: 60
End: 2025-05-29
Payer: COMMERCIAL

## 2025-05-29 DIAGNOSIS — M54.42 CHRONIC LEFT-SIDED LOW BACK PAIN WITH LEFT-SIDED SCIATICA: Primary | ICD-10-CM

## 2025-05-29 DIAGNOSIS — G89.29 CHRONIC LEFT-SIDED LOW BACK PAIN WITH LEFT-SIDED SCIATICA: Primary | ICD-10-CM

## 2025-05-29 DIAGNOSIS — M47.26 OSTEOARTHRITIS OF SPINE WITH RADICULOPATHY, LUMBAR REGION: ICD-10-CM

## 2025-05-29 DIAGNOSIS — M43.10 RETROLISTHESIS OF VERTEBRAE: ICD-10-CM

## 2025-05-29 NOTE — PROGRESS NOTES
Physical Therapy Daily Treatment Note  [address] [place] [address]    Patient: Juvenal Issa   : 1965  Diagnosis/ICD-10 Code:  Chronic left-sided low back pain with left-sided sciatica [M54.42, G89.29]  Referring practitioner: Harvey Bennett PA-C  Date of Initial Visit: Type: THERAPY  Noted: 2025  Today's Date: 2025  Patient seen for 25 sessions           Subjective : Patient reports he's had more symptoms into his left thigh. He has been walking more, using the pull up bar to help decompress his spine, which he feels like is helpful.    Objective   See Exercise, Manual, and Modality Logs for complete treatment.       Assessment/Plan : Pt tolerated today's session well. Continued to work on posture realignment and pain relief. Pt would benefit from continued skilled therapy to address deficits. Progress per plan of care.             Timed:  Manual Therapy:    8     mins  88782;  Therapeutic Exercise:    16     mins  37652;     Neuromuscular Ben:    0    mins  47173;    Therapeutic Activity:     0     mins  35995;     Gait Trainin     mins  42674;     Ultrasound:     0     mins  26205;    Aquatic Therapy    0     mins  51398;        Timed Treatment:   24   mins   Total Treatment:     24   mins    Mio Whaley PT  Physical Therapist    Electronically signed 2025    [place] License: PT - [id number]

## 2025-06-13 ENCOUNTER — TREATMENT (OUTPATIENT)
Dept: PHYSICAL THERAPY | Facility: CLINIC | Age: 60
End: 2025-06-13
Payer: COMMERCIAL

## 2025-06-13 DIAGNOSIS — G89.29 CHRONIC LEFT-SIDED LOW BACK PAIN WITH LEFT-SIDED SCIATICA: Primary | ICD-10-CM

## 2025-06-13 DIAGNOSIS — M47.26 OSTEOARTHRITIS OF SPINE WITH RADICULOPATHY, LUMBAR REGION: ICD-10-CM

## 2025-06-13 DIAGNOSIS — M43.10 RETROLISTHESIS OF VERTEBRAE: ICD-10-CM

## 2025-06-13 DIAGNOSIS — M54.42 CHRONIC LEFT-SIDED LOW BACK PAIN WITH LEFT-SIDED SCIATICA: Primary | ICD-10-CM

## 2025-06-13 NOTE — PROGRESS NOTES
Physical Therapy Daily Treatment Note  [address] [place] [address]    Patient: Juvenal Issa   : 1965  Diagnosis/ICD-10 Code:  Chronic left-sided low back pain with left-sided sciatica [M54.42, G89.29]  Referring practitioner: Harvey Bennett PA-C  Date of Initial Visit: Type: THERAPY  Noted: 2025  Today's Date: 2025  Patient seen for 26 sessions           Subjective : Patient reports he continues to have pain radiating into the left leg and thigh, but feels like it is an indicator that he needs to work on his back and exercise.     Objective   See Exercise, Manual, and Modality Logs for complete treatment.       Assessment/Plan : Pt tolerated today's session well. Updated progression of walking / self corrective and pain modification techniques. Pt would benefit from continued skilled therapy to address deficits. Progress per plan of care.             Timed:  Manual Therapy:    8     mins  45913;  Therapeutic Exercise:    16     mins  09563;     Neuromuscular Ben:    0    mins  01935;    Therapeutic Activity:     0     mins  16202;     Gait Trainin     mins  46780;     Ultrasound:     0     mins  17847;    Aquatic Therapy    0     mins  02082;    Untimed:  Electrical Stimulation:    0     mins  30845 ( );  Dry Needling     0     mins self-pay;  Mechanical Traction    0     mins  13501  Moist Heat     0     mins  No charge  Canalith Repos              0     mins 12871    Timed Treatment:   24   mins   Total Treatment:     24   mins    Mio Whlaey PT  Physical Therapist    Electronically signed 2025    [place] License: PT - [id number]

## 2025-06-20 ENCOUNTER — TREATMENT (OUTPATIENT)
Dept: PHYSICAL THERAPY | Facility: CLINIC | Age: 60
End: 2025-06-20
Payer: COMMERCIAL

## 2025-06-20 DIAGNOSIS — G89.29 CHRONIC LEFT-SIDED LOW BACK PAIN WITH LEFT-SIDED SCIATICA: Primary | ICD-10-CM

## 2025-06-20 DIAGNOSIS — M54.42 CHRONIC LEFT-SIDED LOW BACK PAIN WITH LEFT-SIDED SCIATICA: Primary | ICD-10-CM

## 2025-06-20 DIAGNOSIS — M47.26 OSTEOARTHRITIS OF SPINE WITH RADICULOPATHY, LUMBAR REGION: ICD-10-CM

## 2025-06-20 DIAGNOSIS — M43.10 RETROLISTHESIS OF VERTEBRAE: ICD-10-CM

## 2025-06-20 NOTE — PROGRESS NOTES
Physical Therapy Daily Treatment Note  [address] [place] [address]    Patient: Juvenal Issa   : 1965  Diagnosis/ICD-10 Code:  Chronic left-sided low back pain with left-sided sciatica [M54.42, G89.29]  Referring practitioner: Harvey Bennett PA-C  Date of Initial Visit: Type: THERAPY  Noted: 2025  Today's Date: 2025  Patient seen for 27 sessions           Subjective : Patient reports he has still been doing his routine, though noticing more problems with his left hamstring lately.     Objective   See Exercise, Manual, and Modality Logs for complete treatment.       Assessment/Plan : Pt tolerated today's session well. Pt gives written and verbal consent with description of procedure and review of risks, benefits, precautions, contraindications, and potential side effects. Education provided for post needling soreness and expectations. All dry needling performed to appropriate depth with bony backdrop, or threading to avoid any anatomic structures that are contraindicated. No adverse events noted. Pt would benefit from continued skilled therapy to address deficits. Progress per plan of care.             Timed:  Manual Therapy:    0     mins  27714;  Therapeutic Exercise:    15     mins  14752;     Neuromuscular Ben:    0    mins  85830;    Therapeutic Activity:     0     mins  78355;     Gait Trainin     mins  74892;     Ultrasound:     0     mins  53026;    Aquatic Therapy    0     mins  02061;    Untimed:  Electrical Stimulation:    0     mins  93147 ( );  Dry Needling     10     mins self-pay;  Mechanical Traction    0     mins  02019  Moist Heat     0     mins  No charge  Canalith Repos              0     mins 92877    Timed Treatment:   15   mins   Total Treatment:     25   mins    Mio Whaley PT  Physical Therapist    Electronically signed 2025    [place] License: PT - [id number]

## 2025-06-27 ENCOUNTER — TREATMENT (OUTPATIENT)
Dept: PHYSICAL THERAPY | Facility: CLINIC | Age: 60
End: 2025-06-27
Payer: COMMERCIAL

## 2025-06-27 DIAGNOSIS — M43.10 RETROLISTHESIS OF VERTEBRAE: ICD-10-CM

## 2025-06-27 DIAGNOSIS — M54.42 CHRONIC LEFT-SIDED LOW BACK PAIN WITH LEFT-SIDED SCIATICA: Primary | ICD-10-CM

## 2025-06-27 DIAGNOSIS — M47.26 OSTEOARTHRITIS OF SPINE WITH RADICULOPATHY, LUMBAR REGION: ICD-10-CM

## 2025-06-27 DIAGNOSIS — G89.29 CHRONIC LEFT-SIDED LOW BACK PAIN WITH LEFT-SIDED SCIATICA: Primary | ICD-10-CM

## 2025-06-27 NOTE — PROGRESS NOTES
Progress Assessment  [address] [place] [address]        Patient: Juvenal Issa   : 1965  Diagnosis/ICD-10 Code:  Chronic left-sided low back pain with left-sided sciatica [M54.42, G89.29]  Referring practitioner: Harvey Bennett PA-C  Date of Initial Visit: Type: THERAPY  Noted: 2025  Today's Date: 2025  Patient seen for 28 sessions      Subjective:     Subjective Questionnaire: Oswestry: 5%  Clinical Progress: improved  Home Program Compliance: Yes  Treatment has included: therapeutic exercise, neuromuscular re-education, manual therapy, therapeutic activity, and dry needling    Subjective : Juvenal Issa reports: he is doing well, he continues to work on his HEP, although feels like his hips are off today.    Current Pain 2/10      Objective  Postural Observations     Additional Postural Observation Details  Decreased lumbar lordosis, apparent left leg longer (corrected with MET)        Active Range of Motion      Lumbar   Flexion: Active lumbar flexion: to distal shin, L hamstring tightness.   Extension: Active lumbar extension: limited 25%   Left lateral flexion: Active left lumbar lateral flexion: limited 25%   Right lateral flexion: Active right lumbar lateral flexion: limited 25%   Left rotation: WFL  Right rotation: WFL     Strength/Myotome Testing      Left Hip   Planes of Motion   Flexion: 4+  Extension: 4+  Abduction: 4+  Adduction: 4+     Right Hip   Planes of Motion   Flexion: 4+  Extension: 4+  Abduction: 4+  Adduction: 4+     Tests         Thoracic   Positive slump.          Ambulation      Comments   Right ankle eversion, supination, increased valgus curvature of R tibia        Assessment/Plan    Assessment details: The patient presents to physical therapy with complaints of low back pain with radiation into the left lower extremity with weakness and functional gait changes. Pt has responded well to manual therapy interventions for posture correction and this has  helped correlate with improved function and less pain. He is continually working on his HEP and recovery techniques. He has met his short term goal for LE strength today and is expected to continue to make improvements. He did demonstrate pelvic obliquity today, corrected with MET. He also met one additional goal today. The patient presents with associated lower extremity weakness, lumbar stiffness, and functional deficits (OSWESTRY). The patient would benefit from skilled PT intervention to address the above mentioned functional limitations.      Prognosis: good     Goals  Plan Goals: LOW BACK PROBLEMS:     1. The patient complains of low back pain.  LTG 1: 12 weeks:  The patient will report a pain rating of 3/10 or better at worst in order to improve tolerance to activities of daily living and improve sleep quality.              STATUS:  MET currently  STG 1a: 6 weeks:  The patient will report a pain rating of 5/10 or better at its worst.              STATUS:  MET currently     LTG 1b: 12 weeks:  The patient will report a pain rating no greater than 2/10 or better at worst for THREE WEEKS in order to improve tolerance to activities of daily living and improve sleep quality.              STATUS:  MET           2. The patient demonstrates weakness of the left hip.  LTG 2: 12 weeks:  The patient will demonstrate 5 /5 strength for L hip flexion, abduction, and extension in order to improve hip stability.              STATUS:  Not met  STG 2a: 6 weeks:  The patient will demonstrate 4+ /5 strength for L hip flexion, abduction,and extension.              STATUS:  MET     LTG 2b: 12 weeks:  The patient will demonstrate full gym routine, pain free for THREE WEEKS in order to demonstrate fully established and progressed HEP for the long term and return to his prior status.              STATUS:  Not met, progressing        3. Mobility: Walking/Moving Around Functional Limitation                   LTG 3: 12 weeks:  The  patient will demonstrate 1-19 % limitation by achieving a score of 1-9 on the SUZANNE.              STATUS:  MET currently  STG 3a: The patient will be independent with HEP.                STATUS: MET           4. The patient has limited lumbar AROM  LTG 4: 12 weeks:  The patient will demonstrate lumbar AROM as follows: fingertips to distal shin for flexion and no limitation for extension.              STATUS: MET currently           5. The patient reports radicular symptoms in the left lower extremity.  LTG 5: 12 weeks:  The patient will report a decrease in radicular symptoms in the L lower extremity by 50%.              STATUS:  MET  STG 5a: 6 weeks:  The patient will report a decrease in radicular symptoms in the L lower extremity by 25%.              STATUS:  MET  Progress toward previous goals: Partially Met         Progress toward previous goals: Partially Met    See Exercise, Manual, and Modality Logs for complete treatment.         Recommendations: Continue as planned  Timeframe: 1 month  Prognosis to achieve goals: good    PT Signature: Mio Whaley PT    Electronically signed 2025    [place] License: PT - [id number]     Based upon review of the patient's progress and continued therapy plan, it is my medical opinion that Juvenal Issa should continue physical therapy treatment at [facility] PHYSICAL THERAPY  [address] [facility] [address] [phone].      Timed:         Manual Therapy:    10     mins  63339;     Therapeutic Exercise:    15     mins  28393;     Neuromuscular Ben:    0    mins  86698;    Therapeutic Activity:     0     mins  08386;     Gait Trainin     mins  36219;     Ultrasound:     0     mins  34474;    Self Care                       0     mins   24037  Aquatic                          0     mins 01290          Timed Treatment:   25   mins   Total Treatment:     25   mins      I certify that the therapy services are furnished while this patient  is under my care.  The services outlined above are required by this patient, and will be reviewed every 90 days.

## 2025-07-02 ENCOUNTER — TREATMENT (OUTPATIENT)
Dept: PHYSICAL THERAPY | Facility: CLINIC | Age: 60
End: 2025-07-02
Payer: COMMERCIAL

## 2025-07-02 DIAGNOSIS — M54.42 CHRONIC LEFT-SIDED LOW BACK PAIN WITH LEFT-SIDED SCIATICA: Primary | ICD-10-CM

## 2025-07-02 DIAGNOSIS — M47.26 OSTEOARTHRITIS OF SPINE WITH RADICULOPATHY, LUMBAR REGION: ICD-10-CM

## 2025-07-02 DIAGNOSIS — M43.10 RETROLISTHESIS OF VERTEBRAE: ICD-10-CM

## 2025-07-02 DIAGNOSIS — G89.29 CHRONIC LEFT-SIDED LOW BACK PAIN WITH LEFT-SIDED SCIATICA: Primary | ICD-10-CM

## 2025-07-02 NOTE — PROGRESS NOTES
Physical Therapy Daily Treatment Note  [address] [place] [address]    Patient: Juvenal Issa   : 1965  Diagnosis/ICD-10 Code:  Chronic left-sided low back pain with left-sided sciatica [M54.42, G89.29]  Referring practitioner: Harvey Bennett PA-C  Date of Initial Visit: Type: THERAPY  Noted: 2025  Today's Date: 2025  Patient seen for 29 sessions           Subjective : Patient reports he has been feeling pretty good, though had a pop in his back and was sore, but not having as many symptoms in the leg.    Objective   See Exercise, Manual, and Modality Logs for complete treatment.       Assessment/Plan : Pt tolerated today's session well. Leg length off again today, corrected with MET and discussed self MET for maintaining at home. Pt would benefit from continued skilled therapy to address deficits. Progress per plan of care.             Timed:  Manual Therapy:    10     mins  39585;  Therapeutic Exercise:    12     mins  69873;     Neuromuscular Ben:    0    mins  78783;    Therapeutic Activity:     0     mins  64960;     Gait Trainin     mins  26339;     Ultrasound:     0     mins  18745;    Aquatic Therapy    0     mins  39591;    Untimed:  Electrical Stimulation:    0     mins  03391 ( );  Dry Needling     0     mins self-pay;  Mechanical Traction    0     mins  49173  Moist Heat     0     mins  No charge  Canalith Repos              0     mins 38776    Timed Treatment:   22   mins   Total Treatment:     22   mins    Mio Whaley PT  Physical Therapist    Electronically signed 2025    [place] License: PT - [id number]   
[phone]
no

## 2025-07-11 ENCOUNTER — TREATMENT (OUTPATIENT)
Dept: PHYSICAL THERAPY | Facility: CLINIC | Age: 60
End: 2025-07-11
Payer: COMMERCIAL

## 2025-07-11 DIAGNOSIS — G89.29 CHRONIC LEFT-SIDED LOW BACK PAIN WITH LEFT-SIDED SCIATICA: Primary | ICD-10-CM

## 2025-07-11 DIAGNOSIS — M43.10 RETROLISTHESIS OF VERTEBRAE: ICD-10-CM

## 2025-07-11 DIAGNOSIS — M54.42 CHRONIC LEFT-SIDED LOW BACK PAIN WITH LEFT-SIDED SCIATICA: Primary | ICD-10-CM

## 2025-07-11 DIAGNOSIS — M47.26 OSTEOARTHRITIS OF SPINE WITH RADICULOPATHY, LUMBAR REGION: ICD-10-CM

## 2025-07-11 NOTE — PROGRESS NOTES
Physical Therapy Daily Treatment Note  [address] [place] [address]    Patient: Juvenal Issa   : 1965  Diagnosis/ICD-10 Code:  Chronic left-sided low back pain with left-sided sciatica [M54.42, G89.29]  Referring practitioner: Harvey Bennett PA-C  Date of Initial Visit: Type: THERAPY  Noted: 2025  Today's Date: 2025  Patient seen for 30 sessions           Subjective : Patient reports he has been doing well overall, does feel like his hips are a little off today. He is thinking of removing his heel wedge.    Objective   See Exercise, Manual, and Modality Logs for complete treatment.       Assessment/Plan : Pt tolerated today's session well. Corrected apparent leg length discrepancy with MET targeted at the left pelvis. Pt would benefit from continued skilled therapy to address deficits. Progress per plan of care.             Timed:  Manual Therapy:    10     mins  37741;  Therapeutic Exercise:    10     mins  44854;     Neuromuscular Ben:    0    mins  91603;    Therapeutic Activity:     0     mins  20916;     Gait Trainin     mins  20244;     Ultrasound:     0     mins  31611;    Aquatic Therapy    0     mins  77001;    Untimed:  Electrical Stimulation:    0     mins  60159 ( );  Dry Needling     0     mins self-pay;  Mechanical Traction    0     mins  45032  Moist Heat     0     mins  No charge  Canalith Repos              0     mins [id number]    Timed Treatment:   20   mins   Total Treatment:     20   mins    Mio Whaley PT  Physical Therapist    Electronically signed 2025    [place] License: PT - [id number]

## 2025-07-16 ENCOUNTER — TREATMENT (OUTPATIENT)
Dept: PHYSICAL THERAPY | Facility: CLINIC | Age: 60
End: 2025-07-16
Payer: COMMERCIAL

## 2025-07-16 DIAGNOSIS — G89.29 CHRONIC LEFT-SIDED LOW BACK PAIN WITH LEFT-SIDED SCIATICA: Primary | ICD-10-CM

## 2025-07-16 DIAGNOSIS — M43.10 RETROLISTHESIS OF VERTEBRAE: ICD-10-CM

## 2025-07-16 DIAGNOSIS — M54.42 CHRONIC LEFT-SIDED LOW BACK PAIN WITH LEFT-SIDED SCIATICA: Primary | ICD-10-CM

## 2025-07-16 DIAGNOSIS — M47.26 OSTEOARTHRITIS OF SPINE WITH RADICULOPATHY, LUMBAR REGION: ICD-10-CM

## 2025-07-16 NOTE — PROGRESS NOTES
Physical Therapy Daily Treatment Note/Discharge Summary  [address] [place] [address]    Patient: Juvenal Issa   : 1965  Diagnosis/ICD-10 Code:  Chronic left-sided low back pain with left-sided sciatica [M54.42, G89.29]  Referring practitioner: Harvey Bennett PA-C  Date of Initial Visit: Type: THERAPY  Noted: 2025  Today's Date: 2025  Patient seen for 31 sessions           Subjective : Patient reports he is no longer having the general soreness in his lower back that's been ongoing for years. He feels comfortable with his home program and is good for discharge today.    Objective   See Exercise, Manual, and Modality Logs for complete treatment.       Assessment/Plan : Pt tolerated today's session well. Reviewed current HEP, assessed pelvic and leg length discrepancy and discussed ongoing strengthening to maintain this. After today, patient will be discharged to a home program. He is doing well independently at this time.    Plan Goals: LOW BACK PROBLEMS:     1. The patient complains of low back pain.  LTG 1: 12 weeks:  The patient will report a pain rating of 3/10 or better at worst in order to improve tolerance to activities of daily living and improve sleep quality.              STATUS:  MET currently  STG 1a: 6 weeks:  The patient will report a pain rating of 5/10 or better at its worst.              STATUS:  MET currently     LTG 1b: 12 weeks:  The patient will report a pain rating no greater than 2/10 or better at worst for THREE WEEKS in order to improve tolerance to activities of daily living and improve sleep quality.              STATUS:  MET           2. The patient demonstrates weakness of the left hip.  LTG 2: 12 weeks:  The patient will demonstrate 5 /5 strength for L hip flexion, abduction, and extension in order to improve hip stability.              STATUS:  Not met  STG 2a: 6 weeks:  The patient will demonstrate 4+ /5 strength for L hip flexion, abduction,and  extension.              STATUS:  MET     LTG 2b: 12 weeks:  The patient will demonstrate full gym routine, pain free for THREE WEEKS in order to demonstrate fully established and progressed HEP for the long term and return to his prior status.              STATUS:  Not met, progressing        3. Mobility: Walking/Moving Around Functional Limitation                   LTG 3: 12 weeks:  The patient will demonstrate 1-19 % limitation by achieving a score of 1-9 on the SUZANNE.              STATUS:  MET currently  STG 3a: The patient will be independent with HEP.                STATUS: MET           4. The patient has limited lumbar AROM  LTG 4: 12 weeks:  The patient will demonstrate lumbar AROM as follows: fingertips to distal shin for flexion and no limitation for extension.              STATUS: MET currently           5. The patient reports radicular symptoms in the left lower extremity.  LTG 5: 12 weeks:  The patient will report a decrease in radicular symptoms in the L lower extremity by 50%.              STATUS:  MET  STG 5a: 6 weeks:  The patient will report a decrease in radicular symptoms in the L lower extremity by 25%.              STATUS:  MET  Progress toward previous goals: Partially Met         Timed:  Manual Therapy:    10     mins  33121;  Therapeutic Exercise:    10     mins  03240;     Neuromuscular Ben:    0    mins  33513;    Therapeutic Activity:     0     mins  39697;     Gait Trainin     mins  44857;     Ultrasound:     0     mins  38103;    Aquatic Therapy    0     mins  50758;        Timed Treatment:   20   mins   Total Treatment:     20   mins    Mio Whaley PT  Physical Therapist    Electronically signed 2025    [place] License: PT - [id number]